# Patient Record
Sex: MALE | Race: WHITE | NOT HISPANIC OR LATINO | Employment: FULL TIME | URBAN - METROPOLITAN AREA
[De-identification: names, ages, dates, MRNs, and addresses within clinical notes are randomized per-mention and may not be internally consistent; named-entity substitution may affect disease eponyms.]

---

## 2022-08-31 ENCOUNTER — TRANSFERRED RECORDS (OUTPATIENT)
Dept: HEALTH INFORMATION MANAGEMENT | Facility: CLINIC | Age: 42
End: 2022-08-31

## 2024-05-24 ENCOUNTER — OFFICE VISIT (OUTPATIENT)
Dept: CARDIOLOGY | Facility: CLINIC | Age: 44
End: 2024-05-24
Payer: COMMERCIAL

## 2024-05-24 VITALS
BODY MASS INDEX: 41.94 KG/M2 | DIASTOLIC BLOOD PRESSURE: 94 MMHG | SYSTOLIC BLOOD PRESSURE: 130 MMHG | WEIGHT: 299.6 LBS | HEART RATE: 134 BPM | HEIGHT: 71 IN

## 2024-05-24 DIAGNOSIS — I48.0 PAROXYSMAL ATRIAL FIBRILLATION (H): Primary | ICD-10-CM

## 2024-05-24 DIAGNOSIS — I10 PRIMARY HYPERTENSION: ICD-10-CM

## 2024-05-24 DIAGNOSIS — G47.33 OBSTRUCTIVE SLEEP APNEA SYNDROME: ICD-10-CM

## 2024-05-24 DIAGNOSIS — E66.01 MORBID OBESITY (H): ICD-10-CM

## 2024-05-24 PROCEDURE — 99204 OFFICE O/P NEW MOD 45 MIN: CPT | Performed by: INTERNAL MEDICINE

## 2024-05-24 PROCEDURE — 93000 ELECTROCARDIOGRAM COMPLETE: CPT | Performed by: INTERNAL MEDICINE

## 2024-05-24 RX ORDER — CYCLOBENZAPRINE HCL 5 MG
10 TABLET ORAL PRN
COMMUNITY
Start: 2024-03-21

## 2024-05-24 RX ORDER — DILTIAZEM HYDROCHLORIDE 120 MG/1
120 CAPSULE, EXTENDED RELEASE ORAL DAILY
Qty: 30 CAPSULE | Refills: 11 | Status: SHIPPED | OUTPATIENT
Start: 2024-05-24

## 2024-05-24 RX ORDER — ACETAMINOPHEN 325 MG/1
325-650 TABLET ORAL EVERY 6 HOURS PRN
COMMUNITY
End: 2024-07-18

## 2024-05-24 RX ORDER — CARVEDILOL 25 MG/1
50 TABLET ORAL 2 TIMES DAILY
COMMUNITY
Start: 2023-09-13

## 2024-05-24 RX ORDER — SPIRONOLACTONE 25 MG/1
1 TABLET ORAL DAILY
COMMUNITY
Start: 2023-07-11 | End: 2024-07-29

## 2024-05-24 RX ORDER — ALBUTEROL SULFATE 90 UG/1
2 AEROSOL, METERED RESPIRATORY (INHALATION) PRN
COMMUNITY
Start: 2022-08-31

## 2024-05-24 RX ORDER — LISINOPRIL AND HYDROCHLOROTHIAZIDE 20; 25 MG/1; MG/1
1 TABLET ORAL DAILY
COMMUNITY
Start: 2023-10-13

## 2024-05-24 NOTE — PROGRESS NOTES
HPI and Plan:   I had the pleasure of seeing Max Patiño in cardiology consultation for atrial fibrillation.    Is a 44-year-old male with past medical history of hypertension, obstructive sleep apnea due to obesity on CPAP but not tolerating it, paroxysmal atrial fibrillation who has been treated at [facility] for several years but is moving his care to us.    He was diagnosed with atrial fibrillation few years ago.  He initially was having infrequent palpitations but eventually after some heart monitors placed and implantation of implantable loop recorder, A-fib was confirmed.  He has been on Coreg 50 g twice daily and Xarelto.  Over the last year, his atrial fibrillation episodes are increasing in frequency and now occurring almost every day over the last only 1 to 2 hours.  He feels a fluttering in his chest and sometimes is tired and occasionally associate with shortness of breath.  There is no orthopnea or PND.    Because of frequent episodes of atrial fibrillation, he made this appointment.  Presently, we did an EKG as he was having elevated heart rates and he was in atrial fibrillation with heart rate of 130 bpm.  Coarse atrial flutter cannot be excluded.    Patient also was offered ablation in fact scheduled for that in November 2023 at [facility].  During induction and anesthesia, he had injury to his oral cavity and tonsillar tear with bleeding and the procedure was never performed.    He has had previous echocardiograms and the last one I reviewed through [facility] revealed a visually EF of 50%.  TR was not present therefore RVSP could not be calculated but IVC was dilated.    Patient has been a previous smoker.  He smoked 2 packs/day since age 13 but quit about 3 years ago.  He also was a heavy alcohol user but quit 3 years ago.  Now he does vaping as well as smokes marijuana.    He has family history of stroke in his grandfather and maybe he had atrial  fibrillation.    He has some glucose intolerance with previous A1c of 5.8.  No formal diabetes diagnosis.    On exam, he has irregular S1-S2 but distant heart sounds.  No significant murmurs.  Chest was reauscultation.  No peripheral edema.  He has thick and short neck and difficult to observe JVP.  No carotid bruits.    Of note, patient had a loop recorder in the past but the battery is dead now.    Impression    1.  Paroxysmal atrial fibrillation, increasing frequency of episodes with rapid rate  2.  Obstructive sleep apnea/obesity hypoventilation syndrome, not tolerating CPAP  3.  Hypertension  4.  Prior smoker and heavy alcohol user but quit 3 years ago    Discussion  At this time patient appears to be in atrial fibrillation with rapid rate.  I would suggest adding diltiazem 120 mg long-acting for better rate control and decrease frequency of recurrent episodes.  I will also refer him to electrophysiologist to consider ablation.    He is on Xarelto which will be continued.  His chads vascular score is 1 given his history of hypertension and perhaps to if we consider him to be diabetic.  He has no previous TIA or strokes.  No heart failure.    Echocardiogram will also be obtained to reassess wall motion and ejection fraction.    I will also refer him to sleep medicine as he wants to try another CPAP masks.  He is also trying to lose weight which I encouraged.    At [facility], he used to see Myra Kaur, our midlevel provider who came here from that facility and wants to continue to follow with her which I will arrange.    Plan  Add diltiazem long-acting  EP consultation for rhythm control with antiarrhythmic therapy and/or ablation  Discussed importance of weight loss  Sleep consult for sleep apnea  Follow-up with our DORITA, Myra Kaur  Echocardiogram, if suboptimal may need MRI       Sincerely,    Eliseo Corcoran MD      Today's clinic visit entailed:  Review of external notes as documented  elsewhere in note  Review of the result(s) of each unique test - EKG, ct chest, echo  Ordering of each unique test  Prescription drug management    Provider  Link to MDM Help Grid     The level of medical decision making during this visit was of high complexity.      Orders Placed This Encounter   Procedures    Follow-Up with Cardiology    Follow-Up with Cardiology DORITA    Adult Sleep Eval & Management Referral    EKG 12-lead complete w/read - Clinics (performed today)    Echocardiogram Complete       Orders Placed This Encounter   Medications    lisinopril-hydrochlorothiazide (ZESTORETIC) 20-25 MG tablet     Sig: Take 1 tablet by mouth daily    albuterol (PROAIR HFA/PROVENTIL HFA/VENTOLIN HFA) 108 (90 Base) MCG/ACT inhaler     Sig: Inhale 2 puffs into the lungs as needed    carvedilol (COREG) 25 MG tablet     Sig: Take 50 mg by mouth 2 times daily    cyclobenzaprine (FLEXERIL) 5 MG tablet     Sig: Take 10 mg by mouth as needed    rivaroxaban ANTICOAGULANT (XARELTO) 20 MG TABS tablet     Sig: Take 20 mg by mouth daily    spironolactone (ALDACTONE) 25 MG tablet     Sig: Take 1 tablet by mouth daily    acetaminophen (TYLENOL) 325 MG tablet     Sig: Take 325-650 mg by mouth every 6 hours as needed for mild pain    diltiazem ER (DILT-XR) 120 MG 24 hr capsule     Sig: Take 1 capsule (120 mg) by mouth daily     Dispense:  30 capsule     Refill:  11       Medications Discontinued During This Encounter   Medication Reason    lisinopril (PRINIVIL,ZESTRIL) 20 MG tablet Med Rec(No AVS / No eCancel)    Melatonin 5 MG TABS Med Rec(No AVS / No eCancel)    Multiple Vitamin (DAILY MULTIVITAMIN PO) Med Rec(No AVS / No eCancel)         Encounter Diagnoses   Name Primary?    Primary hypertension     Obstructive sleep apnea syndrome     Morbid obesity (H)     Paroxysmal atrial fibrillation (H) Yes       CURRENT MEDICATIONS:  Current Outpatient Medications   Medication Sig Dispense Refill    acetaminophen (TYLENOL) 325 MG tablet Take  325-650 mg by mouth every 6 hours as needed for mild pain      albuterol (PROAIR HFA/PROVENTIL HFA/VENTOLIN HFA) 108 (90 Base) MCG/ACT inhaler Inhale 2 puffs into the lungs as needed      carvedilol (COREG) 25 MG tablet Take 50 mg by mouth 2 times daily      cyclobenzaprine (FLEXERIL) 5 MG tablet Take 10 mg by mouth as needed      diltiazem ER (DILT-XR) 120 MG 24 hr capsule Take 1 capsule (120 mg) by mouth daily 30 capsule 11    lisinopril-hydrochlorothiazide (ZESTORETIC) 20-25 MG tablet Take 1 tablet by mouth daily      rivaroxaban ANTICOAGULANT (XARELTO) 20 MG TABS tablet Take 20 mg by mouth daily      spironolactone (ALDACTONE) 25 MG tablet Take 1 tablet by mouth daily      HYDROcodone-acetaminophen 5-325 MG per tablet Take 1-2 tablets by mouth every 4 hours as needed for pain. (Patient not taking: Reported on 5/24/2024) 15 tablet 0    sertraline (ZOLOFT) 100 MG tablet Take 1 tablet every day   (Patient not taking: Reported on 5/24/2024) 30 tablet 0    zolpidem (AMBIEN) 10 MG tablet Take 2-3 tablets by mouth nightly as needed. (Patient not taking: Reported on 5/24/2024)         ALLERGIES   No Known Allergies    PAST MEDICAL HISTORY:  Past Medical History:   Diagnosis Date    Anxiety     Hypertension 12/14/2011    Morbid obesity (H) 12/14/2011    Sleep apnea 12/14/2011       PAST SURGICAL HISTORY:  Past Surgical History:   Procedure Laterality Date    ORIF right elbow         FAMILY HISTORY:  Family History   Problem Relation Age of Onset    Cerebrovascular Disease Maternal Grandfather     Hypertension Maternal Grandfather     Respiratory Mother         COPD       SOCIAL HISTORY:  Social History     Socioeconomic History    Marital status: Single     Spouse name: None    Number of children: None    Years of education: None    Highest education level: None   Tobacco Use    Smoking status: Former     Current packs/day: 0.00     Average packs/day: 2.0 packs/day for 18.0 years (36.0 ttl pk-yrs)     Types: Cigarettes  "    Start date: 1993     Quit date: 2011     Years since quittin.7    Smokeless tobacco: Current    Tobacco comments:     Uses vape   Substance and Sexual Activity    Alcohol use: Not Currently     Alcohol/week: 1.7 standard drinks of alcohol     Types: 2 drink(s) per week    Drug use: Yes     Types: Marijuana    Sexual activity: Yes     Partners: Female   Other Topics Concern    Exercise Yes     Comment: treadmill   Social History Narrative    ** Merged History Encounter **            Review of Systems:  Skin:          Eyes:         ENT:         Respiratory:  Negative       Cardiovascular:    Positive for;palpitations;dizziness;lightheadedness    Gastroenterology:        Genitourinary:         Musculoskeletal:         Neurologic:         Psychiatric:         Heme/Lymph/Imm:  Negative      Endocrine:  Negative        Physical Exam:  Vitals: BP (!) 130/94   Pulse (!) 134   Ht 1.803 m (5' 11\")   Wt 135.9 kg (299 lb 9.6 oz)   BMI 41.79 kg/m        CC  Referred Self, MD  No address on file                "

## 2024-05-24 NOTE — LETTER
5/24/2024    Kade Rodriguez MD  [address] [facility] [address]    RE: Max Patiño       Dear Colleague,     I had the pleasure of seeing Max Patiño in the [facility].  HPI and Plan:   I had the pleasure of seeing Max Patiño in cardiology consultation for atrial fibrillation.    Is a 44-year-old male with past medical history of hypertension, obstructive sleep apnea due to obesity on CPAP but not tolerating it, paroxysmal atrial fibrillation who has been treated at  for several years but is moving his care to us.    He was diagnosed with atrial fibrillation few years ago.  He initially was having infrequent palpitations but eventually after some heart monitors placed and implantation of implantable loop recorder, A-fib was confirmed.  He has been on Coreg 50 g twice daily and Xarelto.  Over the last year, his atrial fibrillation episodes are increasing in frequency and now occurring almost every day over the last only 1 to 2 hours.  He feels a fluttering in his chest and sometimes is tired and occasionally associate with shortness of breath.  There is no orthopnea or PND.    Because of frequent episodes of atrial fibrillation, he made this appointment.  Presently, we did an EKG as he was having elevated heart rates and he was in atrial fibrillation with heart rate of 130 bpm.  Coarse atrial flutter cannot be excluded.    Patient also was offered ablation in fact scheduled for that in November 2023 at .  During induction and anesthesia, he had injury to his oral cavity and tonsillar tear with bleeding and the procedure was never performed.    He has had previous echocardiograms and the last one I reviewed through [facility] revealed a visually EF of 50%.  TR was not present therefore RVSP could not be calculated but IVC was dilated.    Patient has been a previous smoker.  He smoked 2 packs/day since age 13 but  quit about 3 years ago.  He also was a heavy alcohol user but quit 3 years ago.  Now he does vaping as well as smokes marijuana.    He has family history of stroke in his grandfather and maybe he had atrial fibrillation.    He has some glucose intolerance with previous A1c of 5.8.  No formal diabetes diagnosis.    On exam, he has irregular S1-S2 but distant heart sounds.  No significant murmurs.  Chest was reauscultation.  No peripheral edema.  He has thick and short neck and difficult to observe JVP.  No carotid bruits.    Of note, patient had a loop recorder in the past but the battery is dead now.    Impression    1.  Paroxysmal atrial fibrillation, increasing frequency of episodes with rapid rate  2.  Obstructive sleep apnea/obesity hypoventilation syndrome, not tolerating CPAP  3.  Hypertension  4.  Prior smoker and heavy alcohol user but quit 3 years ago    Discussion  At this time patient appears to be in atrial fibrillation with rapid rate.  I would suggest adding diltiazem 120 mg long-acting for better rate control and decrease frequency of recurrent episodes.  I will also refer him to electrophysiologist to consider ablation.    He is on Xarelto which will be continued.  His chads vascular score is 1 given his history of hypertension and perhaps to if we consider him to be diabetic.  He has no previous TIA or strokes.  No heart failure.    Echocardiogram will also be obtained to reassess wall motion and ejection fraction.    I will also refer him to sleep medicine as he wants to try another CPAP masks.  He is also trying to lose weight which I encouraged.    At [facility], he used to see Myra Kaur, our midlevel provider who came here from that facility and wants to continue to follow with her which I will arrange.    Plan  Add diltiazem long-acting  EP consultation for rhythm control with antiarrhythmic therapy and/or ablation  Discussed importance of weight loss  Sleep consult for  sleep apnea  Follow-up with our DORITA, Myra Kaur  Echocardiogram, if suboptimal may need MRI       Sincerely,    Eliseo Corcoran MD      Today's clinic visit entailed:  Review of external notes as documented elsewhere in note  Review of the result(s) of each unique test - EKG, ct chest, echo  Ordering of each unique test  Prescription drug management    Provider  Link to [facility] Help Grid     The level of medical decision making during this visit was of high complexity.      Orders Placed This Encounter   Procedures    Follow-Up with Cardiology    Follow-Up with Cardiology DORITA    Adult Sleep Eval & Management Referral    EKG 12-lead complete w/read - Clinics (performed today)    Echocardiogram Complete       Orders Placed This Encounter   Medications    lisinopril-hydrochlorothiazide (ZESTORETIC) 20-25 MG tablet     Sig: Take 1 tablet by mouth daily    albuterol (PROAIR HFA/PROVENTIL HFA/VENTOLIN HFA) 108 (90 Base) MCG/ACT inhaler     Sig: Inhale 2 puffs into the lungs as needed    carvedilol (COREG) 25 MG tablet     Sig: Take 50 mg by mouth 2 times daily    cyclobenzaprine (FLEXERIL) 5 MG tablet     Sig: Take 10 mg by mouth as needed    rivaroxaban ANTICOAGULANT (XARELTO) 20 MG TABS tablet     Sig: Take 20 mg by mouth daily    spironolactone (ALDACTONE) 25 MG tablet     Sig: Take 1 tablet by mouth daily    acetaminophen (TYLENOL) 325 MG tablet     Sig: Take 325-650 mg by mouth every 6 hours as needed for mild pain    diltiazem ER (DILT-XR) 120 MG 24 hr capsule     Sig: Take 1 capsule (120 mg) by mouth daily     Dispense:  30 capsule     Refill:  11       Medications Discontinued During This Encounter   Medication Reason    lisinopril (PRINIVIL,ZESTRIL) 20 MG tablet Med Rec(No AVS / No eCancel)    Melatonin 5 MG TABS Med Rec(No AVS / No eCancel)    Multiple Vitamin (DAILY MULTIVITAMIN PO) Med Rec(No AVS / No eCancel)         Encounter Diagnoses   Name Primary?    Primary hypertension     Obstructive sleep apnea syndrome      Morbid obesity (H)     Paroxysmal atrial fibrillation (H) Yes       CURRENT MEDICATIONS:  Current Outpatient Medications   Medication Sig Dispense Refill    acetaminophen (TYLENOL) 325 MG tablet Take 325-650 mg by mouth every 6 hours as needed for mild pain      albuterol (PROAIR HFA/PROVENTIL HFA/VENTOLIN HFA) 108 (90 Base) MCG/ACT inhaler Inhale 2 puffs into the lungs as needed      carvedilol (COREG) 25 MG tablet Take 50 mg by mouth 2 times daily      cyclobenzaprine (FLEXERIL) 5 MG tablet Take 10 mg by mouth as needed      diltiazem ER (DILT-XR) 120 MG 24 hr capsule Take 1 capsule (120 mg) by mouth daily 30 capsule 11    lisinopril-hydrochlorothiazide (ZESTORETIC) 20-25 MG tablet Take 1 tablet by mouth daily      rivaroxaban ANTICOAGULANT (XARELTO) 20 MG TABS tablet Take 20 mg by mouth daily      spironolactone (ALDACTONE) 25 MG tablet Take 1 tablet by mouth daily      HYDROcodone-acetaminophen 5-325 MG per tablet Take 1-2 tablets by mouth every 4 hours as needed for pain. (Patient not taking: Reported on 5/24/2024) 15 tablet 0    sertraline (ZOLOFT) 100 MG tablet Take 1 tablet every day   (Patient not taking: Reported on 5/24/2024) 30 tablet 0    zolpidem (AMBIEN) 10 MG tablet Take 2-3 tablets by mouth nightly as needed. (Patient not taking: Reported on 5/24/2024)         ALLERGIES   No Known Allergies    PAST MEDICAL HISTORY:  Past Medical History:   Diagnosis Date    Anxiety     Hypertension 12/14/2011    Morbid obesity (H) 12/14/2011    Sleep apnea 12/14/2011       PAST SURGICAL HISTORY:  Past Surgical History:   Procedure Laterality Date    ORIF right elbow         FAMILY HISTORY:  Family History   Problem Relation Age of Onset    Cerebrovascular Disease Maternal Grandfather     Hypertension Maternal Grandfather     Respiratory Mother         COPD       SOCIAL HISTORY:  Social History     Socioeconomic History    Marital status: Single     Spouse name: None    Number of children: None    Years of  "education: None    Highest education level: None   Tobacco Use    Smoking status: Former     Current packs/day: 0.00     Average packs/day: 2.0 packs/day for 18.0 years (36.0 ttl pk-yrs)     Types: Cigarettes     Start date: 1993     Quit date: 2011     Years since quittin.7    Smokeless tobacco: Current    Tobacco comments:     Uses vape   Substance and Sexual Activity    Alcohol use: Not Currently     Alcohol/week: 1.7 standard drinks of alcohol     Types: 2 drink(s) per week    Drug use: Yes     Types: Marijuana    Sexual activity: Yes     Partners: Female   Other Topics Concern    Exercise Yes     Comment: treadmill   Social History Narrative    ** Merged History Encounter **            Review of Systems:  Skin:          Eyes:         ENT:         Respiratory:  Negative       Cardiovascular:    Positive for;palpitations;dizziness;lightheadedness    Gastroenterology:        Genitourinary:         Musculoskeletal:         Neurologic:         Psychiatric:         Heme/Lymph/Imm:  Negative      Endocrine:  Negative        Physical Exam:  Vitals: BP (!) 130/94   Pulse (!) 134   Ht 1.803 m (5' 11\")   Wt 135.9 kg (299 lb 9.6 oz)   BMI 41.79 kg/m        CC  Referred Self,       Thank you for allowing me to participate in the care of your patient.      Sincerely,     Eliseo Corcorna MD     [facility]  "

## 2024-05-30 ENCOUNTER — OFFICE VISIT (OUTPATIENT)
Dept: CARDIOLOGY | Facility: CLINIC | Age: 44
End: 2024-05-30
Attending: INTERNAL MEDICINE
Payer: COMMERCIAL

## 2024-05-30 VITALS
HEIGHT: 71 IN | WEIGHT: 291 LBS | OXYGEN SATURATION: 92 % | HEART RATE: 62 BPM | BODY MASS INDEX: 40.74 KG/M2 | SYSTOLIC BLOOD PRESSURE: 122 MMHG | DIASTOLIC BLOOD PRESSURE: 78 MMHG

## 2024-05-30 DIAGNOSIS — I48.0 PAROXYSMAL ATRIAL FIBRILLATION (H): ICD-10-CM

## 2024-05-30 PROCEDURE — 99214 OFFICE O/P EST MOD 30 MIN: CPT | Performed by: INTERNAL MEDICINE

## 2024-05-30 NOTE — LETTER
5/30/2024    Provider Not In System       RE: Max Patiño       Dear Colleague,     I had the pleasure of seeing Max Patiño in the [facility].    Electrophysiology Clinic Progress Note    Max Patiño MRN# [id number]   YOB: 1980 Age: 44 year old     Primary cardiologist: Dr. Corcoran         Assessment and Plan   Delightful 43 yo male with symptomatic paroxysmal AF past several years in the background of HTN, normal LVEF (2022), obesity and SALVADOR whom had an aborted EP study several months ago at [facility] after  injury to his oropharynx during intubation. Since then sxs more frequent with palpitations and sob. Recent ECG shows AFL with RVR. Echo pending. 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     Agree with rhythm control and given young age recommending ablation. Discussed risks of the procedure including but not limited to vascular injury, cardiac perforation and CVA. Continue with Xarelto for the procedure. Will make sure anesthesiology be aware of previous traumatic experience with intubation. Will need cardiac ct before procedure.           Review of Systems     12-pt ROS is negative except for as noted in the HPI.          Physical Exam     Vitals: There were no vitals taken for this visit.  Wt Readings from Last 10 Encounters:   05/24/24 135.9 kg (299 lb 9.6 oz)   07/17/13 122.5 kg (270 lb)   07/11/13 125.9 kg (277 lb 9.6 oz)   08/08/12 129.7 kg (286 lb)   06/21/12 131.1 kg (289 lb)   05/02/12 (!) 142 kg (313 lb)   04/23/12 (!) 144.7 kg (319 lb)   03/19/12 (!) 145.6 kg (321 lb)   02/08/12 (!) 146.8 kg (323 lb 9.6 oz)   02/01/12 (!) 146.5 kg (323 lb)       Constitutional:  Patient is pleasant, alert, cooperative, and in NAD.  HEENT:  NCAT. PERRLA. EOM's intact.   Neck:  CVP appears normal. No carotid bruits.   Pulmonary: Normal respiratory effort. CTAB.   Cardiac: RRR, normal S1/S2, no S3/S4, no murmur or rub.   Abdomen:  Non-tender abdomen, no  "hepatosplenomegaly appreciated.   Vascular: Pulses in the upper and lower extremities are 2+ and equal bilaterally.  Extremities: No edema, erythema, cyanosis or tenderness appreciated.  Skin:  No rashes or lesions appreciated.   Neurological:  No gross motor or sensory deficits.   Psych: Appropriate affect.          Data   Labs reviewed:  No lab results found.    Invalid input(s): \"CMP\", \"CBC\"    Lab Results   Component Value Date    WBC 10.0 12/14/2011    RBC 4.73 12/14/2011    HGB 14.9 12/14/2011    HCT 44.6 12/14/2011    MCV 94 12/14/2011    MCH 31.5 12/14/2011    MCHC 33.4 12/14/2011    RDW 13.1 12/14/2011     12/14/2011       Lab Results   Component Value Date     12/28/2011    POTASSIUM 4.1 12/28/2011    CHLORIDE 104 12/28/2011    CO2 30 12/28/2011    ANIONGAP 9 12/28/2011    GLC 88 12/28/2011    BUN 17 12/28/2011    CR 0.98 12/28/2011    GFRESTIMATED 88 12/28/2011    GFRESTBLACK >90 12/28/2011    JANNETTE 9.5 12/28/2011      Lab Results   Component Value Date    AST 31 12/14/2011    ALT 31 12/14/2011       No results found for: \"A1C\"    No results found for: \"INR\"         Problem List     Patient Active Problem List   Diagnosis    Sleep apnea    Morbid obesity (H)    Hypertension    Paroxysmal atrial fibrillation (H)            Medications     Current Outpatient Medications   Medication Sig Dispense Refill    acetaminophen (TYLENOL) 325 MG tablet Take 325-650 mg by mouth every 6 hours as needed for mild pain      albuterol (PROAIR HFA/PROVENTIL HFA/VENTOLIN HFA) 108 (90 Base) MCG/ACT inhaler Inhale 2 puffs into the lungs as needed      carvedilol (COREG) 25 MG tablet Take 50 mg by mouth 2 times daily      cyclobenzaprine (FLEXERIL) 5 MG tablet Take 10 mg by mouth as needed      diltiazem ER (DILT-XR) 120 MG 24 hr capsule Take 1 capsule (120 mg) by mouth daily 30 capsule 11    HYDROcodone-acetaminophen 5-325 MG per tablet Take 1-2 tablets by mouth every 4 hours as needed for pain. (Patient not taking: " Reported on 2024) 15 tablet 0    lisinopril-hydrochlorothiazide (ZESTORETIC) 20-25 MG tablet Take 1 tablet by mouth daily      rivaroxaban ANTICOAGULANT (XARELTO) 20 MG TABS tablet Take 20 mg by mouth daily      sertraline (ZOLOFT) 100 MG tablet Take 1 tablet every day   (Patient not taking: Reported on 2024) 30 tablet 0    spironolactone (ALDACTONE) 25 MG tablet Take 1 tablet by mouth daily      zolpidem (AMBIEN) 10 MG tablet Take 2-3 tablets by mouth nightly as needed. (Patient not taking: Reported on 2024)              Past Medical History     Past Medical History:   Diagnosis Date    Anxiety     Hypertension 2011    Morbid obesity (H) 2011    Sleep apnea 2011     Past Surgical History:   Procedure Laterality Date    ORIF right elbow       Family History   Problem Relation Age of Onset    Cerebrovascular Disease Maternal Grandfather     Hypertension Maternal Grandfather     Respiratory Mother         COPD     Social History     Socioeconomic History    Marital status: Single     Spouse name: Not on file    Number of children: Not on file    Years of education: Not on file    Highest education level: Not on file   Occupational History    Not on file   Tobacco Use    Smoking status: Former     Current packs/day: 0.00     Average packs/day: 2.0 packs/day for 18.0 years (36.0 ttl pk-yrs)     Types: Cigarettes     Start date: 1993     Quit date: 2011     Years since quittin.7    Smokeless tobacco: Current    Tobacco comments:     Uses vape   Substance and Sexual Activity    Alcohol use: Not Currently     Alcohol/week: 1.7 standard drinks of alcohol     Types: 2 drink(s) per week    Drug use: Yes     Types: Marijuana    Sexual activity: Yes     Partners: Female   Other Topics Concern     Service Not Asked    Blood Transfusions Not Asked    Caffeine Concern Not Asked    Occupational Exposure Not Asked    Hobby Hazards Not Asked    Sleep Concern Not Asked    Stress  Concern Not Asked    Weight Concern Not Asked    Special Diet Not Asked    Back Care Not Asked    Exercise Yes     Comment: treadmill    Bike Helmet Not Asked    Seat Belt Not Asked    Self-Exams Not Asked   Social History Narrative    ** Merged History Encounter **          Social Determinants of Health     Financial Resource Strain: Not on file   Food Insecurity: Not on file   Transportation Needs: Not on file   Physical Activity: Not on file   Stress: Not on file   Social Connections: Not on file   Interpersonal Safety: Not on file   Housing Stability: Not on file            Allergies   Patient has no known allergies.    Today's clinic visit entailed:  The following tests were independently interpreted by me as noted in my documentation: ecg  30 minutes spent by me on the date of the encounter doing chart review, history and exam, documentation and further activities per the note  Provider  Link to MDM Help Grid     The level of medical decision making during this visit was of moderate complexity.       Thank you for allowing me to participate in the care of your patient.      Sincerely,     Kaushal Roldan MD     [facility]  cc:   Eliseo Corcoran MD  [address]  [place] [address]

## 2024-05-30 NOTE — PROGRESS NOTES
Electrophysiology Clinic Progress Note    Max Patiño MRN# [id number]   YOB: 1980 Age: 44 year old     Primary cardiologist: Dr. Corcoran         Assessment and Plan   Delightful 45 yo male with symptomatic paroxysmal AF past several years in the background of HTN, normal LVEF (2022), obesity and SALVADOR whom had an aborted EP study several months ago at [facility] after  injury to his oropharynx during intubation. Since then sxs more frequent with palpitations and sob. Recent ECG shows AFL with RVR. Echo pending. 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     Agree with rhythm control and given young age recommending ablation. Discussed risks of the procedure including but not limited to vascular injury, cardiac perforation and CVA. Continue with Xarelto for the procedure. Will make sure anesthesiology be aware of previous traumatic experience with intubation. Will need cardiac ct before procedure.           Review of Systems     12-pt ROS is negative except for as noted in the HPI.          Physical Exam     Vitals: There were no vitals taken for this visit.  Wt Readings from Last 10 Encounters:   05/24/24 135.9 kg (299 lb 9.6 oz)   07/17/13 122.5 kg (270 lb)   07/11/13 125.9 kg (277 lb 9.6 oz)   08/08/12 129.7 kg (286 lb)   06/21/12 131.1 kg (289 lb)   05/02/12 (!) 142 kg (313 lb)   04/23/12 (!) 144.7 kg (319 lb)   03/19/12 (!) 145.6 kg (321 lb)   02/08/12 (!) 146.8 kg (323 lb 9.6 oz)   02/01/12 (!) 146.5 kg (323 lb)       Constitutional:  Patient is pleasant, alert, cooperative, and in NAD.  HEENT:  NCAT. PERRLA. EOM's intact.   Neck:  CVP appears normal. No carotid bruits.   Pulmonary: Normal respiratory effort. CTAB.   Cardiac: RRR, normal S1/S2, no S3/S4, no murmur or rub.   Abdomen:  Non-tender abdomen, no hepatosplenomegaly appreciated.   Vascular: Pulses in the upper and lower extremities are 2+ and equal bilaterally.  Extremities: No edema, erythema, cyanosis or tenderness  "appreciated.  Skin:  No rashes or lesions appreciated.   Neurological:  No gross motor or sensory deficits.   Psych: Appropriate affect.          Data   Labs reviewed:  No lab results found.    Invalid input(s): \"CMP\", \"CBC\"    Lab Results   Component Value Date    WBC 10.0 12/14/2011    RBC 4.73 12/14/2011    HGB 14.9 12/14/2011    HCT 44.6 12/14/2011    MCV 94 12/14/2011    MCH 31.5 12/14/2011    MCHC 33.4 12/14/2011    RDW 13.1 12/14/2011     12/14/2011       Lab Results   Component Value Date     12/28/2011    POTASSIUM 4.1 12/28/2011    CHLORIDE 104 12/28/2011    CO2 30 12/28/2011    ANIONGAP 9 12/28/2011    GLC 88 12/28/2011    BUN 17 12/28/2011    CR 0.98 12/28/2011    GFRESTIMATED 88 12/28/2011    GFRESTBLACK >90 12/28/2011    JANNETTE 9.5 12/28/2011      Lab Results   Component Value Date    AST 31 12/14/2011    ALT 31 12/14/2011       No results found for: \"A1C\"    No results found for: \"INR\"         Problem List     Patient Active Problem List   Diagnosis    Sleep apnea    Morbid obesity (H)    Hypertension    Paroxysmal atrial fibrillation (H)            Medications     Current Outpatient Medications   Medication Sig Dispense Refill    acetaminophen (TYLENOL) 325 MG tablet Take 325-650 mg by mouth every 6 hours as needed for mild pain      albuterol (PROAIR HFA/PROVENTIL HFA/VENTOLIN HFA) 108 (90 Base) MCG/ACT inhaler Inhale 2 puffs into the lungs as needed      carvedilol (COREG) 25 MG tablet Take 50 mg by mouth 2 times daily      cyclobenzaprine (FLEXERIL) 5 MG tablet Take 10 mg by mouth as needed      diltiazem ER (DILT-XR) 120 MG 24 hr capsule Take 1 capsule (120 mg) by mouth daily 30 capsule 11    HYDROcodone-acetaminophen 5-325 MG per tablet Take 1-2 tablets by mouth every 4 hours as needed for pain. (Patient not taking: Reported on 5/24/2024) 15 tablet 0    lisinopril-hydrochlorothiazide (ZESTORETIC) 20-25 MG tablet Take 1 tablet by mouth daily      rivaroxaban ANTICOAGULANT (XARELTO) 20 " MG TABS tablet Take 20 mg by mouth daily      sertraline (ZOLOFT) 100 MG tablet Take 1 tablet every day   (Patient not taking: Reported on 2024) 30 tablet 0    spironolactone (ALDACTONE) 25 MG tablet Take 1 tablet by mouth daily      zolpidem (AMBIEN) 10 MG tablet Take 2-3 tablets by mouth nightly as needed. (Patient not taking: Reported on 2024)              Past Medical History     Past Medical History:   Diagnosis Date    Anxiety     Hypertension 2011    Morbid obesity (H) 2011    Sleep apnea 2011     Past Surgical History:   Procedure Laterality Date    ORIF right elbow       Family History   Problem Relation Age of Onset    Cerebrovascular Disease Maternal Grandfather     Hypertension Maternal Grandfather     Respiratory Mother         COPD     Social History     Socioeconomic History    Marital status: Single     Spouse name: Not on file    Number of children: Not on file    Years of education: Not on file    Highest education level: Not on file   Occupational History    Not on file   Tobacco Use    Smoking status: Former     Current packs/day: 0.00     Average packs/day: 2.0 packs/day for 18.0 years (36.0 ttl pk-yrs)     Types: Cigarettes     Start date: 1993     Quit date: 2011     Years since quittin.7    Smokeless tobacco: Current    Tobacco comments:     Uses vape   Substance and Sexual Activity    Alcohol use: Not Currently     Alcohol/week: 1.7 standard drinks of alcohol     Types: 2 drink(s) per week    Drug use: Yes     Types: Marijuana    Sexual activity: Yes     Partners: Female   Other Topics Concern     Service Not Asked    Blood Transfusions Not Asked    Caffeine Concern Not Asked    Occupational Exposure Not Asked    Hobby Hazards Not Asked    Sleep Concern Not Asked    Stress Concern Not Asked    Weight Concern Not Asked    Special Diet Not Asked    Back Care Not Asked    Exercise Yes     Comment: treadmill    Bike Helmet Not Asked    Seat Belt  Not Asked    Self-Exams Not Asked   Social History Narrative    ** Merged History Encounter **          Social Determinants of Health     Financial Resource Strain: Not on file   Food Insecurity: Not on file   Transportation Needs: Not on file   Physical Activity: Not on file   Stress: Not on file   Social Connections: Not on file   Interpersonal Safety: Not on file   Housing Stability: Not on file            Allergies   Patient has no known allergies.    Today's clinic visit entailed:  The following tests were independently interpreted by me as noted in my documentation: ecg  30 minutes spent by me on the date of the encounter doing chart review, history and exam, documentation and further activities per the note  Provider  Link to MDM Help Grid     The level of medical decision making during this visit was of moderate complexity.

## 2024-06-26 ENCOUNTER — HOSPITAL ENCOUNTER (OUTPATIENT)
Dept: CARDIOLOGY | Facility: CLINIC | Age: 44
Discharge: HOME | End: 2024-06-26
Attending: INTERNAL MEDICINE | Admitting: INTERNAL MEDICINE
Payer: COMMERCIAL

## 2024-06-26 DIAGNOSIS — I48.0 PAROXYSMAL ATRIAL FIBRILLATION (H): ICD-10-CM

## 2024-06-26 LAB — LVEF ECHO: NORMAL

## 2024-06-26 PROCEDURE — 93306 TTE W/DOPPLER COMPLETE: CPT | Mod: 26 | Performed by: INTERNAL MEDICINE

## 2024-06-26 PROCEDURE — 255N000002 HC RX 255 OP 636: Performed by: INTERNAL MEDICINE

## 2024-06-26 PROCEDURE — 999N000208 ECHOCARDIOGRAM COMPLETE

## 2024-06-26 RX ADMIN — HUMAN ALBUMIN MICROSPHERES AND PERFLUTREN 3 ML: 10; .22 INJECTION, SOLUTION INTRAVENOUS at 15:07

## 2024-07-05 ENCOUNTER — TELEPHONE (OUTPATIENT)
Dept: CARDIOLOGY | Facility: CLINIC | Age: 44
End: 2024-07-05

## 2024-07-05 ENCOUNTER — HOSPITAL ENCOUNTER (OUTPATIENT)
Dept: CARDIOLOGY | Facility: CLINIC | Age: 44
Discharge: HOME | End: 2024-07-05
Attending: INTERNAL MEDICINE | Admitting: INTERNAL MEDICINE
Payer: COMMERCIAL

## 2024-07-05 DIAGNOSIS — I48.0 PAROXYSMAL ATRIAL FIBRILLATION (H): Primary | ICD-10-CM

## 2024-07-05 DIAGNOSIS — I48.0 PAROXYSMAL ATRIAL FIBRILLATION (H): ICD-10-CM

## 2024-07-05 PROCEDURE — 75572 CT HRT W/3D IMAGE: CPT | Mod: 26 | Performed by: INTERNAL MEDICINE

## 2024-07-05 PROCEDURE — 250N000011 HC RX IP 250 OP 636: Performed by: INTERNAL MEDICINE

## 2024-07-05 PROCEDURE — 75572 CT HRT W/3D IMAGE: CPT

## 2024-07-05 RX ORDER — IOPAMIDOL 755 MG/ML
500 INJECTION, SOLUTION INTRAVASCULAR ONCE
Status: COMPLETED | OUTPATIENT
Start: 2024-07-05 | End: 2024-07-05

## 2024-07-05 RX ORDER — SODIUM CHLORIDE, SODIUM LACTATE, POTASSIUM CHLORIDE, CALCIUM CHLORIDE 600; 310; 30; 20 MG/100ML; MG/100ML; MG/100ML; MG/100ML
INJECTION, SOLUTION INTRAVENOUS CONTINUOUS
Status: CANCELLED | OUTPATIENT
Start: 2024-07-05

## 2024-07-05 RX ORDER — LIDOCAINE 40 MG/G
CREAM TOPICAL
Status: CANCELLED | OUTPATIENT
Start: 2024-07-05

## 2024-07-05 RX ORDER — LIDOCAINE 40 MG/G
CREAM TOPICAL
Status: DISCONTINUED | OUTPATIENT
Start: 2024-07-05 | End: 2024-07-06 | Stop reason: HOSPADM

## 2024-07-05 RX ADMIN — IOPAMIDOL 100 ML: 755 INJECTION, SOLUTION INTRAVENOUS at 12:38

## 2024-07-05 NOTE — TELEPHONE ENCOUNTER
Spoke to pt regarding A Fib ablation on Monday 7/8.  Pt is aware of arrival time of 1030 and discussed where pt is to be dropped of at.    Pt reminded to have No solids 8 hours prior to arrival time  And may have Clear liquids up until 2 hours prior to arrival  Discussed clear liquids allowed ( 7 up, ginger ale, chicken broth, apple juice, water, coffee with no cream or sugar)   Pt may have sips of water for am meds    Discussed meds to be held:     Oral diabetes meds: NONE  Insulin: NONE    SGLT2 Inhibitors: NONE  - Invokana (canagliflozin), Farxiga (dapagliflozin), Jardiance (empagliflozin), Steglatro (ertugliflozin)  - hold 3-4 days prior to procedure    GLP-1 Agonists: NONE  - Byetta (exenitide), Victoza (liraglutide), Ozempic, Wegovy, Rybelsus (semaglutide), Trulicity (dulaglutide), Mounjaro (tirzepatide), Bydureon (Exenatide ER), Adlyxin (Lixisendatide)   - (Weekly dosing, hold GLP-1 agonists 7 days before procedure)  - (Daily or BID dosing, hold GLP-1 agonists day before and day of procedure)  - (Oral semaglutide, hold 7 days before procedure due to long half-life)    Diuretic: Spironolactone-to be held in the morning         Patient has a  for ride home and someone to stay with pt x 24 hours once pt arrives home   Pt given [facility] number to call if unable to  proceed with ablation on Monday d/t illness, Ie fever, covid, cough, cold  Pt aware that he will go home the same day if procedure goes well, pt is able to void, no elevated pain and groin bleed.   Pt voiced understanding and stated he has no further questions at this time.  Ebenezer

## 2024-07-08 ENCOUNTER — ANESTHESIA (OUTPATIENT)
Dept: CARDIOLOGY | Facility: CLINIC | Age: 44
End: 2024-07-08
Payer: COMMERCIAL

## 2024-07-08 ENCOUNTER — HOSPITAL ENCOUNTER (OUTPATIENT)
Facility: CLINIC | Age: 44
Discharge: HOME | End: 2024-07-08
Attending: INTERNAL MEDICINE | Admitting: INTERNAL MEDICINE
Payer: COMMERCIAL

## 2024-07-08 ENCOUNTER — TELEPHONE (OUTPATIENT)
Dept: CARDIOLOGY | Facility: CLINIC | Age: 44
End: 2024-07-08

## 2024-07-08 ENCOUNTER — ANESTHESIA EVENT (OUTPATIENT)
Dept: CARDIOLOGY | Facility: CLINIC | Age: 44
End: 2024-07-08
Payer: COMMERCIAL

## 2024-07-08 VITALS
RESPIRATION RATE: 16 BRPM | WEIGHT: 289.6 LBS | OXYGEN SATURATION: 97 % | HEART RATE: 65 BPM | HEIGHT: 71 IN | BODY MASS INDEX: 40.54 KG/M2 | TEMPERATURE: 96.8 F | SYSTOLIC BLOOD PRESSURE: 119 MMHG | DIASTOLIC BLOOD PRESSURE: 74 MMHG

## 2024-07-08 DIAGNOSIS — I48.0 PAROXYSMAL ATRIAL FIBRILLATION (H): ICD-10-CM

## 2024-07-08 DIAGNOSIS — I48.0 PAROXYSMAL ATRIAL FIBRILLATION (H): Primary | ICD-10-CM

## 2024-07-08 LAB
ACT BLD: 266 SECONDS (ref 74–150)
ANION GAP SERPL CALCULATED.3IONS-SCNC: 8 MMOL/L (ref 7–15)
BUN SERPL-MCNC: 14 MG/DL (ref 6–20)
CALCIUM SERPL-MCNC: 9.6 MG/DL (ref 8.6–10)
CHLORIDE SERPL-SCNC: 103 MMOL/L (ref 98–107)
CREAT SERPL-MCNC: 0.97 MG/DL (ref 0.67–1.17)
DEPRECATED HCO3 PLAS-SCNC: 27 MMOL/L (ref 22–29)
EGFRCR SERPLBLD CKD-EPI 2021: >90 ML/MIN/1.73M2
ERYTHROCYTE [DISTWIDTH] IN BLOOD BY AUTOMATED COUNT: 12.7 % (ref 10–15)
GLUCOSE SERPL-MCNC: 97 MG/DL (ref 70–99)
HCT VFR BLD AUTO: 39.7 % (ref 40–53)
HGB BLD-MCNC: 13 G/DL (ref 13.3–17.7)
MCH RBC QN AUTO: 31 PG (ref 26.5–33)
MCHC RBC AUTO-ENTMCNC: 32.7 G/DL (ref 31.5–36.5)
MCV RBC AUTO: 95 FL (ref 78–100)
PLATELET # BLD AUTO: 247 10E3/UL (ref 150–450)
POTASSIUM SERPL-SCNC: 4.8 MMOL/L (ref 3.4–5.3)
RBC # BLD AUTO: 4.19 10E6/UL (ref 4.4–5.9)
SODIUM SERPL-SCNC: 138 MMOL/L (ref 135–145)
WBC # BLD AUTO: 10.7 10E3/UL (ref 4–11)

## 2024-07-08 PROCEDURE — 93656 COMPRE EP EVAL ABLTJ ATR FIB: CPT | Performed by: INTERNAL MEDICINE

## 2024-07-08 PROCEDURE — 250N000011 HC RX IP 250 OP 636: Performed by: INTERNAL MEDICINE

## 2024-07-08 PROCEDURE — C1887 CATHETER, GUIDING: HCPCS | Performed by: INTERNAL MEDICINE

## 2024-07-08 PROCEDURE — C1732 CATH, EP, DIAG/ABL, 3D/VECT: HCPCS | Performed by: INTERNAL MEDICINE

## 2024-07-08 PROCEDURE — 93655 ICAR CATH ABLTJ DSCRT ARRHYT: CPT | Performed by: INTERNAL MEDICINE

## 2024-07-08 PROCEDURE — 250N000011 HC RX IP 250 OP 636: Performed by: ANESTHESIOLOGY

## 2024-07-08 PROCEDURE — 258N000003 HC RX IP 258 OP 636: Performed by: INTERNAL MEDICINE

## 2024-07-08 PROCEDURE — 370N000017 HC ANESTHESIA TECHNICAL FEE, PER MIN: Performed by: INTERNAL MEDICINE

## 2024-07-08 PROCEDURE — 250N000011 HC RX IP 250 OP 636: Performed by: NURSE ANESTHETIST, CERTIFIED REGISTERED

## 2024-07-08 PROCEDURE — 258N000003 HC RX IP 258 OP 636: Performed by: NURSE ANESTHETIST, CERTIFIED REGISTERED

## 2024-07-08 PROCEDURE — C1730 CATH, EP, 19 OR FEW ELECT: HCPCS | Performed by: INTERNAL MEDICINE

## 2024-07-08 PROCEDURE — 999N000184 HC STATISTIC TELEMETRY

## 2024-07-08 PROCEDURE — 999N000071 HC STATISTIC HEART CATH LAB OR EP LAB

## 2024-07-08 PROCEDURE — C1766 INTRO/SHEATH,STRBLE,NON-PEEL: HCPCS | Performed by: INTERNAL MEDICINE

## 2024-07-08 PROCEDURE — C1759 CATH, INTRA ECHOCARDIOGRAPHY: HCPCS | Performed by: INTERNAL MEDICINE

## 2024-07-08 PROCEDURE — 93656 COMPRE EP EVAL ABLTJ ATR FIB: CPT | Performed by: NURSE ANESTHETIST, CERTIFIED REGISTERED

## 2024-07-08 PROCEDURE — 93656 COMPRE EP EVAL ABLTJ ATR FIB: CPT | Performed by: ANESTHESIOLOGY

## 2024-07-08 PROCEDURE — C1760 CLOSURE DEV, VASC: HCPCS | Performed by: INTERNAL MEDICINE

## 2024-07-08 PROCEDURE — C1733 CATH, EP, OTHR THAN COOL-TIP: HCPCS | Performed by: INTERNAL MEDICINE

## 2024-07-08 PROCEDURE — 272N000001 HC OR GENERAL SUPPLY STERILE: Performed by: INTERNAL MEDICINE

## 2024-07-08 PROCEDURE — 250N000009 HC RX 250: Performed by: NURSE ANESTHETIST, CERTIFIED REGISTERED

## 2024-07-08 PROCEDURE — 250N000025 HC SEVOFLURANE, PER MIN: Performed by: INTERNAL MEDICINE

## 2024-07-08 PROCEDURE — 36415 COLL VENOUS BLD VENIPUNCTURE: CPT | Performed by: INTERNAL MEDICINE

## 2024-07-08 PROCEDURE — 80048 BASIC METABOLIC PNL TOTAL CA: CPT | Performed by: INTERNAL MEDICINE

## 2024-07-08 PROCEDURE — 85347 COAGULATION TIME ACTIVATED: CPT

## 2024-07-08 PROCEDURE — 85027 COMPLETE CBC AUTOMATED: CPT | Performed by: INTERNAL MEDICINE

## 2024-07-08 RX ORDER — OXYCODONE HYDROCHLORIDE 5 MG/1
10 TABLET ORAL
Status: DISCONTINUED | OUTPATIENT
Start: 2024-07-08 | End: 2024-07-08 | Stop reason: HOSPADM

## 2024-07-08 RX ORDER — GLYCOPYRROLATE 0.2 MG/ML
INJECTION, SOLUTION INTRAMUSCULAR; INTRAVENOUS PRN
Status: DISCONTINUED | OUTPATIENT
Start: 2024-07-08 | End: 2024-07-08

## 2024-07-08 RX ORDER — DEXAMETHASONE SODIUM PHOSPHATE 4 MG/ML
4 INJECTION, SOLUTION INTRA-ARTICULAR; INTRALESIONAL; INTRAMUSCULAR; INTRAVENOUS; SOFT TISSUE
Status: DISCONTINUED | OUTPATIENT
Start: 2024-07-08 | End: 2024-07-08 | Stop reason: HOSPADM

## 2024-07-08 RX ORDER — LIDOCAINE 40 MG/G
CREAM TOPICAL
Status: DISCONTINUED | OUTPATIENT
Start: 2024-07-08 | End: 2024-07-08 | Stop reason: HOSPADM

## 2024-07-08 RX ORDER — SODIUM CHLORIDE, SODIUM LACTATE, POTASSIUM CHLORIDE, CALCIUM CHLORIDE 600; 310; 30; 20 MG/100ML; MG/100ML; MG/100ML; MG/100ML
INJECTION, SOLUTION INTRAVENOUS CONTINUOUS
Status: DISCONTINUED | OUTPATIENT
Start: 2024-07-08 | End: 2024-07-08 | Stop reason: HOSPADM

## 2024-07-08 RX ORDER — NALOXONE HYDROCHLORIDE 0.4 MG/ML
0.4 INJECTION, SOLUTION INTRAMUSCULAR; INTRAVENOUS; SUBCUTANEOUS
Status: DISCONTINUED | OUTPATIENT
Start: 2024-07-08 | End: 2024-07-08 | Stop reason: HOSPADM

## 2024-07-08 RX ORDER — HEPARIN SODIUM 1000 [USP'U]/ML
INJECTION, SOLUTION INTRAVENOUS; SUBCUTANEOUS
Status: DISCONTINUED | OUTPATIENT
Start: 2024-07-08 | End: 2024-07-08 | Stop reason: HOSPADM

## 2024-07-08 RX ORDER — NALOXONE HYDROCHLORIDE 0.4 MG/ML
0.2 INJECTION, SOLUTION INTRAMUSCULAR; INTRAVENOUS; SUBCUTANEOUS
Status: DISCONTINUED | OUTPATIENT
Start: 2024-07-08 | End: 2024-07-08 | Stop reason: HOSPADM

## 2024-07-08 RX ORDER — NALOXONE HYDROCHLORIDE 0.4 MG/ML
0.1 INJECTION, SOLUTION INTRAMUSCULAR; INTRAVENOUS; SUBCUTANEOUS
Status: DISCONTINUED | OUTPATIENT
Start: 2024-07-08 | End: 2024-07-08 | Stop reason: HOSPADM

## 2024-07-08 RX ORDER — MEPERIDINE HYDROCHLORIDE 25 MG/ML
12.5 INJECTION INTRAMUSCULAR; INTRAVENOUS; SUBCUTANEOUS EVERY 5 MIN PRN
Status: DISCONTINUED | OUTPATIENT
Start: 2024-07-08 | End: 2024-07-08 | Stop reason: HOSPADM

## 2024-07-08 RX ORDER — HYDROMORPHONE HYDROCHLORIDE 1 MG/ML
0.2 INJECTION, SOLUTION INTRAMUSCULAR; INTRAVENOUS; SUBCUTANEOUS EVERY 5 MIN PRN
Status: DISCONTINUED | OUTPATIENT
Start: 2024-07-08 | End: 2024-07-08 | Stop reason: HOSPADM

## 2024-07-08 RX ORDER — PROTAMINE SULFATE 10 MG/ML
INJECTION, SOLUTION INTRAVENOUS
Status: DISCONTINUED | OUTPATIENT
Start: 2024-07-08 | End: 2024-07-08 | Stop reason: HOSPADM

## 2024-07-08 RX ORDER — FENTANYL CITRATE 50 UG/ML
25 INJECTION, SOLUTION INTRAMUSCULAR; INTRAVENOUS
Status: DISCONTINUED | OUTPATIENT
Start: 2024-07-08 | End: 2024-07-08 | Stop reason: HOSPADM

## 2024-07-08 RX ORDER — ONDANSETRON 4 MG/1
4 TABLET, ORALLY DISINTEGRATING ORAL EVERY 30 MIN PRN
Status: DISCONTINUED | OUTPATIENT
Start: 2024-07-08 | End: 2024-07-08 | Stop reason: HOSPADM

## 2024-07-08 RX ORDER — ONDANSETRON 2 MG/ML
4 INJECTION INTRAMUSCULAR; INTRAVENOUS EVERY 30 MIN PRN
Status: DISCONTINUED | OUTPATIENT
Start: 2024-07-08 | End: 2024-07-08 | Stop reason: HOSPADM

## 2024-07-08 RX ORDER — DEXAMETHASONE SODIUM PHOSPHATE 4 MG/ML
INJECTION, SOLUTION INTRA-ARTICULAR; INTRALESIONAL; INTRAMUSCULAR; INTRAVENOUS; SOFT TISSUE PRN
Status: DISCONTINUED | OUTPATIENT
Start: 2024-07-08 | End: 2024-07-08

## 2024-07-08 RX ORDER — OXYCODONE AND ACETAMINOPHEN 5; 325 MG/1; MG/1
1 TABLET ORAL EVERY 4 HOURS PRN
Status: DISCONTINUED | OUTPATIENT
Start: 2024-07-08 | End: 2024-07-08 | Stop reason: HOSPADM

## 2024-07-08 RX ORDER — HYDROMORPHONE HYDROCHLORIDE 1 MG/ML
0.4 INJECTION, SOLUTION INTRAMUSCULAR; INTRAVENOUS; SUBCUTANEOUS EVERY 5 MIN PRN
Status: DISCONTINUED | OUTPATIENT
Start: 2024-07-08 | End: 2024-07-08 | Stop reason: HOSPADM

## 2024-07-08 RX ORDER — ACETAMINOPHEN 325 MG/1
975 TABLET ORAL
Status: DISCONTINUED | OUTPATIENT
Start: 2024-07-08 | End: 2024-07-08 | Stop reason: HOSPADM

## 2024-07-08 RX ORDER — DEXMEDETOMIDINE HYDROCHLORIDE 4 UG/ML
INJECTION, SOLUTION INTRAVENOUS PRN
Status: DISCONTINUED | OUTPATIENT
Start: 2024-07-08 | End: 2024-07-08

## 2024-07-08 RX ORDER — PROPOFOL 10 MG/ML
INJECTION, EMULSION INTRAVENOUS PRN
Status: DISCONTINUED | OUTPATIENT
Start: 2024-07-08 | End: 2024-07-08

## 2024-07-08 RX ORDER — FENTANYL CITRATE 50 UG/ML
50 INJECTION, SOLUTION INTRAMUSCULAR; INTRAVENOUS EVERY 5 MIN PRN
Status: DISCONTINUED | OUTPATIENT
Start: 2024-07-08 | End: 2024-07-08 | Stop reason: HOSPADM

## 2024-07-08 RX ORDER — KETAMINE HYDROCHLORIDE 10 MG/ML
INJECTION INTRAMUSCULAR; INTRAVENOUS PRN
Status: DISCONTINUED | OUTPATIENT
Start: 2024-07-08 | End: 2024-07-08

## 2024-07-08 RX ORDER — KETOROLAC TROMETHAMINE 15 MG/ML
INJECTION, SOLUTION INTRAMUSCULAR; INTRAVENOUS
Status: DISCONTINUED | OUTPATIENT
Start: 2024-07-08 | End: 2024-07-08 | Stop reason: HOSPADM

## 2024-07-08 RX ORDER — ONDANSETRON 2 MG/ML
INJECTION INTRAMUSCULAR; INTRAVENOUS PRN
Status: DISCONTINUED | OUTPATIENT
Start: 2024-07-08 | End: 2024-07-08

## 2024-07-08 RX ORDER — OXYCODONE HYDROCHLORIDE 5 MG/1
5 TABLET ORAL
Status: DISCONTINUED | OUTPATIENT
Start: 2024-07-08 | End: 2024-07-08 | Stop reason: HOSPADM

## 2024-07-08 RX ORDER — FENTANYL CITRATE 50 UG/ML
25 INJECTION, SOLUTION INTRAMUSCULAR; INTRAVENOUS EVERY 5 MIN PRN
Status: DISCONTINUED | OUTPATIENT
Start: 2024-07-08 | End: 2024-07-08 | Stop reason: HOSPADM

## 2024-07-08 RX ORDER — FENTANYL CITRATE 50 UG/ML
INJECTION, SOLUTION INTRAMUSCULAR; INTRAVENOUS PRN
Status: DISCONTINUED | OUTPATIENT
Start: 2024-07-08 | End: 2024-07-08

## 2024-07-08 RX ADMIN — PHENYLEPHRINE HYDROCHLORIDE 100 MCG: 10 INJECTION INTRAVENOUS at 12:19

## 2024-07-08 RX ADMIN — PHENYLEPHRINE HYDROCHLORIDE 0.5 MCG/KG/MIN: 10 INJECTION INTRAVENOUS at 12:17

## 2024-07-08 RX ADMIN — DEXAMETHASONE SODIUM PHOSPHATE 4 MG: 4 INJECTION, SOLUTION INTRA-ARTICULAR; INTRALESIONAL; INTRAMUSCULAR; INTRAVENOUS; SOFT TISSUE at 12:15

## 2024-07-08 RX ADMIN — ROCURONIUM BROMIDE 100 MG: 50 INJECTION, SOLUTION INTRAVENOUS at 12:10

## 2024-07-08 RX ADMIN — PROPOFOL 20 MCG/KG/MIN: 10 INJECTION, EMULSION INTRAVENOUS at 12:17

## 2024-07-08 RX ADMIN — PHENYLEPHRINE HYDROCHLORIDE 100 MCG: 10 INJECTION INTRAVENOUS at 12:51

## 2024-07-08 RX ADMIN — DEXMEDETOMIDINE HYDROCHLORIDE 20 MCG: 200 INJECTION INTRAVENOUS at 13:11

## 2024-07-08 RX ADMIN — SODIUM CHLORIDE, POTASSIUM CHLORIDE, SODIUM LACTATE AND CALCIUM CHLORIDE: 600; 310; 30; 20 INJECTION, SOLUTION INTRAVENOUS at 11:34

## 2024-07-08 RX ADMIN — FENTANYL CITRATE 50 MCG: 50 INJECTION, SOLUTION INTRAMUSCULAR; INTRAVENOUS at 14:10

## 2024-07-08 RX ADMIN — PROPOFOL 200 MG: 10 INJECTION, EMULSION INTRAVENOUS at 12:10

## 2024-07-08 RX ADMIN — MIDAZOLAM 2 MG: 1 INJECTION INTRAMUSCULAR; INTRAVENOUS at 12:05

## 2024-07-08 RX ADMIN — ONDANSETRON 4 MG: 2 INJECTION INTRAMUSCULAR; INTRAVENOUS at 13:21

## 2024-07-08 RX ADMIN — GLYCOPYRROLATE 0.4 MG: 0.2 INJECTION, SOLUTION INTRAMUSCULAR; INTRAVENOUS at 12:52

## 2024-07-08 RX ADMIN — DEXMEDETOMIDINE HYDROCHLORIDE 20 MCG: 200 INJECTION INTRAVENOUS at 13:21

## 2024-07-08 RX ADMIN — SUGAMMADEX 300 MG: 100 INJECTION, SOLUTION INTRAVENOUS at 13:26

## 2024-07-08 RX ADMIN — Medication 50 MG: at 12:09

## 2024-07-08 RX ADMIN — FENTANYL CITRATE 100 MCG: 50 INJECTION INTRAMUSCULAR; INTRAVENOUS at 12:08

## 2024-07-08 RX ADMIN — PHENYLEPHRINE HYDROCHLORIDE 150 MCG: 10 INJECTION INTRAVENOUS at 12:24

## 2024-07-08 ASSESSMENT — ACTIVITIES OF DAILY LIVING (ADL)
ADLS_ACUITY_SCORE: 35

## 2024-07-08 ASSESSMENT — COPD QUESTIONNAIRES: COPD: 1

## 2024-07-08 ASSESSMENT — ENCOUNTER SYMPTOMS: DYSRHYTHMIAS: 1

## 2024-07-08 NOTE — DISCHARGE SUMMARY
[facility] Discharge Nursing Note    Patient Information  Name: Max Patiño  Age: 44 year old    Discharge Education:  Discharge instructions reviewed: Yes  Additional education/resources provided: no  Patient/patient representative verbalizes understanding: Yes  Patient discharging on new medications: No  Medication education completed: N/A    Discharge Plans:   Discharge location: home  Discharge ride contacted: Yes  Approximate discharge time: 1530    Discharge Criteria:  Discharge criteria met and vital signs stable: Yes    Patient Belongs:  Patient belongings returned to patient: Yes    Brown Hinson RN     2

## 2024-07-08 NOTE — PROGRESS NOTES
1525 Report received from John Hinson RN.  1530 Pt A/O. Gauze drsg CDI to right groin puncture sites. No oozing or hematoma noted. Area soft & flat. Pt denies pain. Activity restrictions reinforced with pt with verbal understanding received. Pt's sister at bedside. Detailed update given. OOB - steady on feet. Pt dressed & ready for discharge.   1540 Pt states that he needs a return to work release. Release obtained & given to pt. Pt also needs new Xarelto RX as he is completely out of them.  1550 Outpt RX called. There is a problem with pt's pharmacy & insurance company.   1555 Pt taking diet & flds well. No complaints.  1600 SABINE Graham - A Fib nurse called & detailed update given. Will contact Dr Mata & discuss.  1615 Return call from SABINE Graham. Will switch pt over to Eliquis, discontinue Xarelto, fill Eliquis RX in Outpt Pharmacy & send pt an Eliquis copay card in the mail. Pt informed.   1630 Discharge teaching & instructions reinforced with both pt & sister w/ verbal understanding received. All questions & concerns addressed.  1705 Eliquis RX given to pt.   1715 Pt discharged per w/c to private vehicle. All personal belongings taken w/ pt.

## 2024-07-08 NOTE — DISCHARGE INSTRUCTIONS
A Fib Ablation Discharge Instructions    After you go home:  Have an adult stay with you until tomorrow.  You may eat your normal diet, unless your doctor tells you otherwise.  RELAX and take it easy for 3 days.        For 24-48 hours (due to the sedation you received):  DO NOT DRIVE FOR 2 DAYS!   Do NOT make any important or legal decisions.  Do NOT drive or operate machines at home or at work.  Do NOT drink alcohol.    Care of Puncture Site:  Check the puncture site severy 1-2 hours while awake.  For 2-3 days, when you cough, sneeze, laugh or move your bowels, hold your hand over the puncture sites and press firmly.  Please remove Dressing after 24 hours.  Then apply a band aid daily for at least 3 days (if needed). If there is minor oozing, apply another band aid and remove it after 12 hours.   It is normal to have a bruising or a small lump that is present under the skin . This will go away on its own after 3-4 weeks.   You may shower. Do NOT take a bath, or use a hot tub or pool until groin site heals, which may take up to a week.  Do NOT scrub the site. Do NOT use lotion or powder near the puncture site.    Activity:  NO bending, stooping over or squatting  for 3 days  Do NOT lift, push or pull more than 10 pounds (equal to a gallon of milk) for 3 days.  NO repetitive motions such as loading , vacuuming, raking, shoveling,   Limit going up and down the stairs repetitively, for the first 24 hours after procedure.     Bleeding:  If you start bleeding from the groin site, lie down flat and press firmly on the site for 10 minutes or until bleeding stops.   Once bleeding stops, lay flat for 1-2 hours.  Call your A Fib nurse if bleeding does not stop or after hours will need to go to ER.       Go to ER or Call 911 right away if you have heavy bleeding or bleeding that does not stop.    Medications:  Take your medications, including blood thinners, unless your doctor tells you not to.  If you have stopped  any other medicines, check with your nurse or provider about when to restart them.  If you have PAIN or a TIGHTNESS in your chest, you MAY take Tylenol (acetaminophen) and if this does not help, you MAY take Advil (ibuprofen-400 mg with food).  If you have constipation or prone to constipation,  take a fiber supplement, ie metamucil or stool softners.    Call the A Fib RN if:  Chest pain not relieved by acetaminophen or ibuprofen  Difficulty swallowing and/or coughing up blood  Shortness of breath  Increased groin pain or a large or growing hard lump around the site.  Groin site is red, swollen, hot or tender.  Blood or fluid is draining from the groin site.  You have chills or a fever greater than 101 F (38 C).  Your leg feels numb, cool or changes color.  If groin pain is not relieved by Tylenol or Ibuprofen.  Recurrent irregular or fast heart rate lasting over 2-3 hours.  Any questions or concerns.    Heart rhythms:  You may have some irregular heartbeats. These feel very strong. They may make you feel that the A Fib is going to start again.  Give it time. The irregular beats should occur less often.    Follow Up Appointments:  Frances Hernández on 7/18/24 at 12:30 with and EKG  Dr Mata on 10/8/24 at 4:15 with and EKG     [facility]   A Fib clinic RN's Sylvia Graham [phone] (Mon-Fri, 8:00-4:30)   [phone] Option 2 (7 days a week) after hours for on call Cardiologist.

## 2024-07-08 NOTE — ANESTHESIA CARE TRANSFER NOTE
Patient: Max Patiño    Procedure: Procedure(s):  Ablation Atrial Fibrilation       Diagnosis: af/afl  Diagnosis Additional Information: No value filed.    Anesthesia Type:   General     Note:    Oropharynx: oral airway in place  Level of Consciousness: awake  Oxygen Supplementation: face mask  Level of Supplemental Oxygen (L/min / FiO2): 10  Independent Airway: airway patency satisfactory and stable  Dentition: dentition unchanged  Vital Signs Stable: post-procedure vital signs reviewed and stable  Report to RN Given: handoff report given  Patient transferred to: PACU    Handoff Report: Identifed the Patient, Identified the Reponsible Provider, Reviewed the pertinent medical history, Discussed the surgical course, Reviewed Intra-OP anesthesia mangement and issues during anesthesia, Set expectations for post-procedure period and Allowed opportunity for questions and acknowledgement of understanding  Vitals:  Vitals Value Taken Time   BP     Temp     Pulse 64 07/08/24 1343   Resp 15 07/08/24 1343   SpO2 99 % 07/08/24 1343   Vitals shown include unfiled device data.    Electronically Signed By: TEO Campos CRNA  July 8, 2024  1:44 PM

## 2024-07-08 NOTE — PROGRESS NOTES
Uneventful PVI and empiric ablation of the CTI  Resume Xarelto tonight  Accesses perclosed  Bedrest for an hour.

## 2024-07-08 NOTE — ANESTHESIA POSTPROCEDURE EVALUATION
Patient: Max Patiño    Procedure: Procedure(s):  Ablation Atrial Fibrilation       Anesthesia Type:  General    Note:  Disposition: Outpatient   Postop Pain Control: Uneventful            Sign Out: Well controlled pain   PONV: No   Neuro/Psych: Uneventful            Sign Out: Acceptable/Baseline neuro status   Airway/Respiratory: Uneventful            Sign Out: Acceptable/Baseline resp. status   CV/Hemodynamics: Uneventful            Sign Out: Acceptable CV status; No obvious hypovolemia; No obvious fluid overload   Other NRE:    DID A NON-ROUTINE EVENT OCCUR?     Event details/Postop Comments:  Patient is an easy intubation with a glidescope. Do not know where the difficult intubation tag came from (it was added on from this stay). Prior intubation traumatic but not because patient was a difficult intubation.           Last vitals:  Vitals Value Taken Time   /65 07/08/24 1440   Temp 36  C (96.8  F) 07/08/24 1342   Pulse 62 07/08/24 1448   Resp 15 07/08/24 1448   SpO2 96 % 07/08/24 1449   Vitals shown include unfiled device data.    Electronically Signed By: Salina Gambino MD  July 8, 2024  3:38 PM

## 2024-07-08 NOTE — H&P
[facility]    Cardiac Electrophysiology Consultation     Date of Admission:  7/8/2024  Date of Consult (When I saw the patient): 07/08/24    Assessment & Plan   Delightful 45 yo male with symptomatic paroxysmal AF past several years in the background of HTN, normal LVEF (2022), obesity and SALVADOR whom had an [place] [facility] to his oropharynx during intubation. Since then sxs more frequent with palpitations and sob. Recent ECG shows AFL with RVR. Echo pending. 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      Agree with rhythm control and given young age recommending ablation. Discussed risks of the procedure including but not limited to vascular injury, cardiac perforation and CVA. Continue with Xarelto for the procedure. Will make sure anesthesiology be aware of previous traumatic experience with intubation. Will need cardiac ct before procedure.    No change since last visit. Normal LVEF.    Kaushal Roldan MD    Code Status    No Order    Primary Care Physician   Provider Not In System    History is obtained from the patient    Past Medical History   I have reviewed this patient's medical history and updated it with pertinent information if needed.   Past Medical History:   Diagnosis Date    Anxiety     Difficult intubation     [place] [facility] to do an a.fib ablation    Hypertension 12/14/2011    Morbid obesity (H) 12/14/2011    Sleep apnea 12/14/2011    Uncomplicated asthma        Past Surgical History   I have reviewed this patient's surgical history and updated it with pertinent information if needed.  Past Surgical History:   Procedure Laterality Date    EP ABLATION FOCAL AFIB  2024 [place] [facility].  case aborted.    EP LOOP RECORDER      ORIF right elbow         Prior to Admission Medications   Prior to Admission Medications   Prescriptions Last Dose Informant Patient Reported?  Taking?   Coenzyme Q10 (COQ10 PO) Past Week  Yes Yes   HYDROcodone-acetaminophen 5-325 MG per tablet   No No   Sig: Take 1-2 tablets by mouth every 4 hours as needed for pain.   Patient not taking: Reported on 5/30/2024   Multiple Vitamins-Minerals (CENTRUM ADULT PO) Past Week  Yes Yes   Omega-3 Fatty Acids (FISH OIL PO) Past Week  Yes Yes   acetaminophen (TYLENOL) 325 MG tablet   Yes No   Sig: Take 325-650 mg by mouth every 6 hours as needed for mild pain   albuterol (PROAIR HFA/PROVENTIL HFA/VENTOLIN HFA) 108 (90 Base) MCG/ACT inhaler More than a month  Yes Yes   Sig: Inhale 2 puffs into the lungs as needed   carvedilol (COREG) 25 MG tablet 7/8/2024  Yes Yes   Sig: Take 50 mg by mouth 2 times daily   cyclobenzaprine (FLEXERIL) 5 MG tablet More than a month  Yes Yes   Sig: Take 10 mg by mouth as needed   diltiazem ER (DILT-XR) 120 MG 24 hr capsule   No No   Sig: Take 1 capsule (120 mg) by mouth daily   Patient not taking: Reported on 5/30/2024   lisinopril-hydrochlorothiazide (ZESTORETIC) 20-25 MG tablet 7/8/2024  Yes Yes   Sig: Take 1 tablet by mouth daily   rivaroxaban ANTICOAGULANT (XARELTO) 20 MG TABS tablet 7/7/2024  Yes Yes   Sig: Take 20 mg by mouth daily   sertraline (ZOLOFT) 100 MG tablet   No No   Sig: Take 1 tablet every day     Patient not taking: Reported on 5/24/2024   spironolactone (ALDACTONE) 25 MG tablet Past Week  Yes Yes   Sig: Take 1 tablet by mouth daily   zolpidem (AMBIEN) 10 MG tablet   Yes No   Sig: Take 2-3 tablets by mouth nightly as needed.   Patient not taking: Reported on 5/24/2024      Facility-Administered Medications: None     Allergies   No Known Allergies    Social History   I have reviewed this patient's social history and updated it with pertinent information if needed. Max Patiño  reports that he quit smoking about 12 years ago. His smoking use included cigarettes. He started smoking about 30 years ago. He has a 36 pack-year smoking history. He uses smokeless tobacco. He  reports that he does not currently use alcohol after a past usage of about 1.7 standard drinks of alcohol per week. He reports current drug use. Drug: Marijuana.    Family History   I have reviewed this patient's family history and updated it with pertinent information if needed.   Family History   Problem Relation Age of Onset    Cerebrovascular Disease Maternal Grandfather     Hypertension Maternal Grandfather     Respiratory Mother         COPD       Review of Systems   Comprehensive review of systems was performed with pertinent positives and negatives listed in assessment and plan section.    Physical Exam   Temp: 98.1  F (36.7  C) Temp src: Oral BP: 103/71 Pulse: 52   Resp: 18 SpO2: 95 % O2 Device: None (Room air)    Vital Signs with Ranges  Temp:  [98.1  F (36.7  C)] 98.1  F (36.7  C)  Pulse:  [52] 52  Resp:  [18] 18  BP: (103-133)/(69-71) 103/71  SpO2:  [95 %] 95 %  289 lbs 9.6 oz    Constitutional: awake, alert, cooperative, no apparent distress, and appears stated age  Eyes: Lids and lashes normal, pupils equal, round and reactive to light, extra ocular muscles intact, sclera clear, conjunctiva normal  ENT: Normocephalic, without obvious abnormality, atraumatic, sinuses nontender on palpation, external ears without lesions, oral pharynx with moist mucous membranes, tonsils without erythema or exudates, gums normal and good dentition.  Hematologic / Lymphatic: no cervical lymphadenopathy  Respiratory: No increased work of breathing, good air exchange, clear to auscultation bilaterally, no crackles or wheezing  Cardiovascular: Normal apical impulse, regular rate and rhythm, normal S1 and S2, no S3 or S4, and no murmur noted  GI: No scars, normal bowel sounds, soft, non-distended, non-tender, no masses palpated, no hepatosplenomegally  Skin: no bruising or bleeding  Musculoskeletal: There is no redness, warmth, or swelling of the joints.  Full range of motion noted.    Neurologic: Awake, alert, oriented to  name, place and time.    Neuropsychiatric: General: normal, calm, and normal eye contact    Data   I personally reviewed all recent ECGs and images.  Results for orders placed or performed during the hospital encounter of 07/08/24 (from the past 24 hour(s))   CBC with platelets   Result Value Ref Range    WBC Count 10.7 4.0 - 11.0 10e3/uL    RBC Count 4.19 (L) 4.40 - 5.90 10e6/uL    Hemoglobin 13.0 (L) 13.3 - 17.7 g/dL    Hematocrit 39.7 (L) 40.0 - 53.0 %    MCV 95 78 - 100 fL    MCH 31.0 26.5 - 33.0 pg    MCHC 32.7 31.5 - 36.5 g/dL    RDW 12.7 10.0 - 15.0 %    Platelet Count 247 150 - 450 10e3/uL

## 2024-07-08 NOTE — TELEPHONE ENCOUNTER
Pt unable to  his Xarelto to do confusion at pharmacy and insurance and pt is unable to pay until Friday.  Spoke to Dr Mata and will change pt to Eliquis 5 mg bid and the first month will be free. Will mail the $10 co-pay card to pt for further refills. Ebenezer

## 2024-07-08 NOTE — ANESTHESIA PREPROCEDURE EVALUATION
"Anesthesia Pre-Procedure Evaluation    Patient: Max Patiño   MRN: [id number] : 1980        Procedure : Procedure(s):  Ablation Atrial Fibrilation          Past Medical History:   Diagnosis Date    Anxiety     Difficult intubation     2024 at [facility] [place] an attempt to do an a.fib ablation    Hypertension 2011    Morbid obesity (H) 2011    Sleep apnea 2011    Uncomplicated asthma       Past Surgical History:   Procedure Laterality Date    EP ABLATION FOCAL AFIB  2024 at [facility] with difficult intubation.  case aborted.    EP LOOP RECORDER      ORIF right elbow        No Known Allergies   Social History     Tobacco Use    Smoking status: Former     Current packs/day: 0.00     Average packs/day: 2.0 packs/day for 18.0 years (36.0 ttl pk-yrs)     Types: Cigarettes     Start date: 1993     Quit date: 2011     Years since quittin.8    Smokeless tobacco: Current    Tobacco comments:     Uses vape   Substance Use Topics    Alcohol use: Not Currently     Alcohol/week: 1.7 standard drinks of alcohol     Types: 2 drink(s) per week      Wt Readings from Last 1 Encounters:   24 131.4 kg (289 lb 9.6 oz)        Anesthesia Evaluation   Pt has had prior anesthetic.     History of anesthetic complications (had an airway trauma during prior anesthetic. Per anesthesia record, intubation was \"easy\")  - .      ROS/MED HX  ENT/Pulmonary:     (+) sleep apnea, doesn't use CPAP,                   Intermittent, asthma (does not use inhalers)    COPD,              Neurologic:       Cardiovascular:     (+) Dyslipidemia hypertension- -   -  - -                        dysrhythmias, a-fib,             METS/Exercise Tolerance:     Hematologic:       Musculoskeletal:       GI/Hepatic:     (+) GERD (asymptomatic, anesthesia record mentions edematous and red oropharynx tissue thought to be d/t GERD. Not on medications.), Other,                  Renal/Genitourinary:  - neg Renal ROS     Endo:   " "  (+)               Obesity (MO, BMI 40.4),       Psychiatric/Substance Use:       Infectious Disease:       Malignancy:       Other:            Physical Exam    Airway        Mallampati: II   TM distance: > 3 FB   Neck ROM: full   Mouth opening: > 3 cm    Respiratory Devices and Support         Dental       (+) Modest Abnormalities - crowns, retainers, 1 or 2 missing teeth      Cardiovascular          Rhythm and rate: regular and normal     Pulmonary           breath sounds clear to auscultation           OUTSIDE LABS:  CBC:   Lab Results   Component Value Date    WBC 10.7 07/08/2024    WBC 10.0 12/14/2011    HGB 13.0 (L) 07/08/2024    HGB 14.9 12/14/2011    HCT 39.7 (L) 07/08/2024    HCT 44.6 12/14/2011     07/08/2024     12/14/2011     BMP:   Lab Results   Component Value Date     12/28/2011     12/14/2011    POTASSIUM 4.1 12/28/2011    POTASSIUM 4.2 12/14/2011    CHLORIDE 104 12/28/2011    CHLORIDE 104 12/14/2011    CO2 30 12/28/2011    CO2 29 12/14/2011    BUN 17 12/28/2011    BUN 17 12/14/2011    CR 0.98 12/28/2011    CR 0.94 12/14/2011    GLC 88 12/28/2011    GLC 99 12/14/2011     COAGS: No results found for: \"PTT\", \"INR\", \"FIBR\"  POC: No results found for: \"BGM\", \"HCG\", \"HCGS\"  HEPATIC:   Lab Results   Component Value Date    ALBUMIN 4.4 12/14/2011    PROTTOTAL 7.8 12/14/2011    ALT 31 12/14/2011    AST 31 12/14/2011    ALKPHOS 84 12/14/2011    BILITOTAL 0.5 12/14/2011     OTHER:   Lab Results   Component Value Date    JANNETTE 9.5 12/28/2011    TSH 2.28 12/14/2011    CRP 5.1 12/14/2011    SED 7 12/14/2011       Anesthesia Plan    ASA Status:  3    NPO Status:  NPO Appropriate    Anesthesia Type: General.     - Airway: ETT         Techniques and Equipment:     - Airway: Video-Laryngoscope       Consents    Anesthesia Plan(s) and associated risks, benefits, and realistic alternatives discussed. Questions answered and patient/representative(s) expressed understanding.     - Discussed: " "Risks, Benefits and Alternatives for BOTH SEDATION and the PROCEDURE were discussed     - Discussed with:  Patient            Postoperative Care            Comments:               Salina Gambino MD    I have reviewed the pertinent notes and labs in the chart from the past 30 days and (re)examined the patient.  Any updates or changes from those notes are reflected in this note.            # Drug Induced Coagulation Defect: home medication list includes an anticoagulant medication   # Severe Obesity: Estimated body mass index is 40.39 kg/m  as calculated from the following:    Height as of this encounter: 1.803 m (5' 11\").    Weight as of this encounter: 131.4 kg (289 lb 9.6 oz).      "

## 2024-07-08 NOTE — PROGRESS NOTES
Care Suites Admission Nursing Note    Patient Information  Name: Max Patiño  Age: 44 year old  Reason for admission: afib ablation  Care Suites arrival time: 1100    Visitor Information  Name: Clarisa Chavez [phone]     Patient Admission/Assessment   Pre-procedure assessment complete: Yes  If abnormal assessment/labs, provider notified: No  NPO: Yes  Medications held per instructions/orders: N/A  Consent: obtained  If applicable, pregnancy test status: declined  Patient oriented to room: Yes  Education/questions answered: Yes  Plan/other: Review labs, obtain consent and proceed with scheduled treatment or intervention      Discharge Planning  Discharge name/phone number: Clarisa barkley - [phone]  Overnight post sedation caregiver: Clarisa Chavez [phone]  Discharge location: [place]    Brown Hinson RN

## 2024-07-09 ENCOUNTER — TELEPHONE (OUTPATIENT)
Dept: CARDIOLOGY | Facility: CLINIC | Age: 44
End: 2024-07-09

## 2024-07-09 NOTE — TELEPHONE ENCOUNTER
7/9/24 Called pt in follow up to  Afib Ablation performed yesterday     Reviewed discharge instructions with patient    Pain level is 0    Groin dressing is C/D and I with plans to remove later today. Wound care discussed and reasons to call Afib RN     Breathing feels comfortable     Pt is eating, drinking and urinating without difficulty . LBM was pre-procedure . Discussed if no BM by tomorrow , ok to use OTC stool softener of Miralax     Reviewed activity instructions    Reviewed any medication changes - none    Discussed that possible Afib episodes may occur and to call if > 2 hours in duration or if symptomatic or sustained HR > 120    Discussed reasons to call Afib RN    Reminded of follow up appointments    Verified pt has Afib RN and after hours Cardiology phone numbers    Pt voiced understanding and has no further questions/concerns at this time.    Kamryn  338 pm

## 2024-07-18 ENCOUNTER — OFFICE VISIT (OUTPATIENT)
Dept: CARDIOLOGY | Facility: CLINIC | Age: 44
End: 2024-07-18
Payer: COMMERCIAL

## 2024-07-18 VITALS
DIASTOLIC BLOOD PRESSURE: 73 MMHG | HEART RATE: 61 BPM | RESPIRATION RATE: 25 BRPM | WEIGHT: 295 LBS | SYSTOLIC BLOOD PRESSURE: 109 MMHG | BODY MASS INDEX: 41.3 KG/M2 | HEIGHT: 71 IN | OXYGEN SATURATION: 98 %

## 2024-07-18 DIAGNOSIS — G47.33 OBSTRUCTIVE SLEEP APNEA SYNDROME: ICD-10-CM

## 2024-07-18 DIAGNOSIS — I48.0 PAROXYSMAL ATRIAL FIBRILLATION (H): ICD-10-CM

## 2024-07-18 DIAGNOSIS — I48.0 PAF (PAROXYSMAL ATRIAL FIBRILLATION) (H): Primary | ICD-10-CM

## 2024-07-18 PROCEDURE — 99215 OFFICE O/P EST HI 40 MIN: CPT | Performed by: NURSE PRACTITIONER

## 2024-07-18 PROCEDURE — 93000 ELECTROCARDIOGRAM COMPLETE: CPT | Performed by: NURSE PRACTITIONER

## 2024-07-18 NOTE — PATIENT INSTRUCTIONS
Today's Recommendations    Please call with concerns of break through atrial fibrillation.  Remain on current medications  Please follow up with Dr. Mata in October.    Please send [employer] message or call for further questions or concerns.     It was a pleasure to see you today.     Gregory-    Frances Hernández APRN, CNP    EP [phone]  Device [phone]  General scheduling and after hours number [phone]  EP scheduling [phone]

## 2024-07-18 NOTE — LETTER
7/18/2024    Provider Not In System       RE: Max Patiño       Dear Colleague,     I had the [facility].    Electrophysiology Clinic Progress Note  Max Patiño MRN# [id number]   YOB: 1980 Age: 44 year old     Primary cardiologist: Dr. Mata (EP) and Dr. Corcoran (general)    Reason for visit: Atrial fibrillation    History of presenting illness:    Max Patiño is a pleasant 44 year old patient with past medical history significant for:    Symptomatic paroxysmal atrial fibrillation with RVR: History of palpitations and underwent ILR in 2020 that showed increasing burden of PAF on rate control with carvedilol 50 mg twice daily and anticoagulation with Xarelto..  Underwent an attempted ablation [facility] in 11/2023 but was aborted due to injury to his oropharynx during intubation and sedation.  Status post EPS and PVI as well as empiric CTI ablation on 7/8/2024  [id number] score 1 (hypertension) currently anticoagulated on Xarelto  Hypertension  Obesity  SALVADOR on CPAP but not tolerating  Previous heavy EtOH use and smoker (2 packs/day x since the age of 13) quit both approximately 3 years ago  Currently vapes and uses marijuana    Previously followed with electrophysiology at [facility] and had ILR placed in 2020 that noted increased AF burden. In 11/2023 an ablation was attempted at [facility], but during induction and anesthesia he had an injury to his oral cavity with a tonsillar tear and bleeding.  Therefore, the procedure was never completed. The patient sought a second opinion and was evaluated by Dr. Corcoran on 5/24/2024.  At that visit it was recommended that he add long-acting diltiazem to carvedilol 50 mg twice daily, update echocardiogram, sleep consult,  EP consultation for management of AF.    The patient met with Dr. Mata on 5/30/2024 who recommended proceeding with an ablation that was completed on 7/8/2024.  He underwent an uneventful wide  circumferential PVI ablation and empiric ablation of the CTI.    Today he returns for a follow-up post the procedure.  He has had intermittent chest discomfort and palpitations but no significant breakthroughs of AF that he is aware of.  He does report that he did not start diltiazem as requested by Dr. Mata.  Of note, the patient has an MRI coming up this weekend and will be meeting with orthopedics for timing of a hip replacement.    Diagnotic studies:  EKG 7/18/2024: SR 66 bpm, , QT 4/1/2012, QTc 422, QRS duration 119  Echocardiogram 6/2024: LVEF of 60 to 65% without valvular or wall motion abnormalities.  EKG 5/24/2024: AF with RVR.            Assessment and Plan:     ASSESSMENT:    Symptomatic paroxysmal atrial fibrillation with RVR   History of palpitations and underwent ILR in 2020 (device is no longer functional) that showed increasing burden of PAF on rate control with carvedilol 50 mg twice daily and anticoagulation with Eliquis previously on Xarelto but was cost prohibitive.  [place]went an [place] [facility] [place] but was aborted due to injury to his oropharynx during intubation and sedation.  Status post EPS and PVI as well as empiric CTI ablation on 7/8/2024  [id number] score 1 (hypertension)   EKG today notes SR  Rate control with carvedilol 50 mg twice daily    Hypertension  Well controlled on Aldactone mg daily, lisinopril-HCTZ 20-25 mg daily and carvedilol 50 mg twice daily    PLAN:     Continue present medical therapy and anticoagulation for now.  Return to visit follow-up with Dr. Mata in October and can discuss stopping AC.  In the future, the patient would like to follow-up with Myra Kaur PA-C as he previously followed with her at [facility].       Orders this Visit:  Orders Placed This Encounter   Procedures     EKG 12-lead complete w/read - Clinics (performed today)     No orders of the defined types were placed in this encounter.    Medications Discontinued During This  "Encounter   Medication Reason     HYDROcodone-acetaminophen 5-325 MG per tablet Stopped by Patient (No AVS)     sertraline (ZOLOFT) 100 MG tablet Stopped by Patient (No AVS)     zolpidem (AMBIEN) 10 MG tablet Stopped by Patient (No AVS)     acetaminophen (TYLENOL) 325 MG tablet Stopped by Patient (No AVS)       Today's clinic visit entailed:  Review of prior external note(s) from - CareEverywhere information from [facility]  reviewed  Review of the result(s) of each unique test - Echo, EKG, ILR recording  The following tests were independently interpreted by me as noted in my documentation: EKG  I spent a total of 40 minutes on the day of the visit.   Time spent by me doing chart review, history and exam, documentation and further activities per the note  Provider  Link to [facility] Help Grid     The level of medical decision making during this visit was of moderate complexity.           Review of Systems:     Review of Systems:  Skin:  not assessed     Eyes:  not assessed    ENT:  not assessed    Respiratory:  Negative    Cardiovascular:    Positive for;palpitations  Gastroenterology: Positive for reflux  Genitourinary:  not assessed    Musculoskeletal:  not assessed    Neurologic:  Negative    Psychiatric:  not assessed    Heme/Lymph/Imm:  Negative    Endocrine:  Negative              Physical Exam:     Vitals: /73   Pulse 61   Resp 25   Ht 1.803 m (5' 11\")   Wt 133.8 kg (295 lb)   SpO2 98%   BMI 41.14 kg/m    Constitutional: Well nourished and in no apparent distress.  Eyes: Pupils equal, round.   HEENT: Normocephalic, atraumatic.   Neck: Supple.   Respiratory: Breathing non-labored. Lungs clear to auscultation bilaterally.  Cardiovascular:  Regular rate and rhythm, normal S1 and S2. No murmur   Skin: Warm, dry.   Extremities: No edema. No concerns with right femoral venous site  Neurologic: No gross motor deficits. Alert, awake, and oriented to person, place and time.  Psychiatric: " Affect appropriate.        CURRENT MEDICATIONS:  Current Outpatient Medications   Medication Sig Dispense Refill     albuterol (PROAIR HFA/PROVENTIL HFA/VENTOLIN HFA) 108 (90 Base) MCG/ACT inhaler Inhale 2 puffs into the lungs as needed       apixaban ANTICOAGULANT (ELIQUIS) 5 MG tablet Take 1 tablet (5 mg) by mouth 2 times daily 60 tablet 0     carvedilol (COREG) 25 MG tablet Take 50 mg by mouth 2 times daily       Coenzyme Q10 (COQ10 PO)        cyclobenzaprine (FLEXERIL) 5 MG tablet Take 10 mg by mouth as needed       lisinopril-hydrochlorothiazide (ZESTORETIC) 20-25 MG tablet Take 1 tablet by mouth daily       Multiple Vitamins-Minerals (CENTRUM ADULT PO)        Omega-3 Fatty Acids (FISH OIL PO)        spironolactone (ALDACTONE) 25 MG tablet Take 1 tablet by mouth daily       diltiazem ER (DILT-XR) 120 MG 24 hr capsule Take 1 capsule (120 mg) by mouth daily (Patient not taking: Reported on 5/30/2024) 30 capsule 11       ALLERGIES  No Known Allergies      PAST MEDICAL HISTORY:  Past Medical History:   Diagnosis Date     Anxiety      Difficult intubation     11/2024 at [facility] [place] with an attempt to do an a.fib ablation     Hypertension 12/14/2011     Morbid obesity (H) 12/14/2011     Sleep apnea 12/14/2011     Uncomplicated asthma        PAST SURGICAL HISTORY:  Past Surgical History:   Procedure Laterality Date     EP ABLATION FOCAL AFIB  2023 11/23 at [facility] [place] w/ difficult intubtion - case aborted     EP ABLATION FOCAL AFIB N/A 7/8/2024    Procedure: Ablation Atrial Fibrilation;  Surgeon: Kaushal Mata MD;  Location: [facility] CARDIAC CATH LAB     EP LOOP RECORDER       ORIF right elbow         FAMILY HISTORY:  Family History   Problem Relation Age of Onset     Cerebrovascular Disease Maternal Grandfather      Hypertension Maternal Grandfather      Respiratory Mother         COPD       SOCIAL HISTORY:  Social History     Socioeconomic History     Marital status: Single   Tobacco Use     Smoking status: Former      Current packs/day: 0.00     Average packs/day: 2.0 packs/day for 18.0 years (36.0 ttl pk-yrs)     Types: Cigarettes     Start date: 1993     Quit date: 2011     Years since quittin.8     Smokeless tobacco: Current     Tobacco comments:     Uses vape   Substance and Sexual Activity     Alcohol use: Not Currently     Alcohol/week: 1.7 standard drinks of alcohol     Types: 2 drink(s) per week     Drug use: Yes     Types: Marijuana     Comment: pt did this yesterday 24     Sexual activity: Yes     Partners: Female   Other Topics Concern     Exercise Yes     Comment: treadmill   Social History Narrative    ** Merged History Encounter **                  Thank you for allowing me to participate in the care of your patient.      Sincerely,     [facility]  cc:   No referring provider defined for this encounter.

## 2024-07-18 NOTE — PROGRESS NOTES
Electrophysiology Clinic Progress Note  Max Patiño MRN# [id number]   YOB: 1980 Age: 44 year old     Primary cardiologist: Dr. Mata (EP) and Dr. Corcoran (general)    Reason for visit: Atrial fibrillation    History of presenting illness:    Max Patiño is a pleasant 44 year old patient with past medical history significant for:    Symptomatic paroxysmal atrial fibrillation with RVR: History of palpitations and underwent ILR in 2020 that showed increasing burden of PAF on rate control with carvedilol 50 mg twice daily and anticoagulation with Xarelto..  Underwent an attempted ablation at [facility] in 11/2023 but was aborted due to injury to his oropharynx during intubation and sedation.  Status post EPS and PVI as well as empiric CTI ablation on 7/8/2024  [id number] score 1 (hypertension) currently anticoagulated on Xarelto  Hypertension  Obesity  SALVADOR on CPAP but not tolerating  Previous heavy EtOH use and smoker (2 packs/day x since the age of 13) quit both approximately 3 years ago  Currently vapes and uses marijuana    Previously followed with electrophysiology at [facility] and had ILR placed in 2020 that noted increased AF burden. In 11/2023 an ablation was attempted at [facility], but during induction and anesthesia he had an injury to his oral cavity with a tonsillar tear and bleeding.  Therefore, the procedure was never completed. The patient sought a second opinion and was evaluated by Dr. Corcoran on 5/24/2024.  At that visit it was recommended that he add long-acting diltiazem to carvedilol 50 mg twice daily, update echocardiogram, sleep consult,  EP consultation for management of AF.    The patient met with Dr. Mata on 5/30/2024 who recommended proceeding with an ablation that was completed on 7/8/2024.  He underwent an uneventful wide circumferential PVI ablation and empiric ablation of the CTI.    Today he returns for a follow-up post the procedure.  He has had intermittent chest discomfort and  palpitations but no significant breakthroughs of AF that he is aware of.  He does report that he did not start diltiazem as requested by Dr. Mata.  Of note, the patient has an MRI coming up this weekend and will be meeting with orthopedics for timing of a hip replacement.    Diagnotic studies:  EKG 7/18/2024: SR 66 bpm, , QT 4/1/2012, QTc 422, QRS duration 119  Echocardiogram 6/2024: LVEF of 60 to 65% without valvular or wall motion abnormalities.  EKG 5/24/2024: AF with RVR.            Assessment and Plan:     ASSESSMENT:    Symptomatic paroxysmal atrial fibrillation with RVR   History of palpitations and underwent ILR in 2020 (device is no longer functional) that showed increasing burden of PAF on rate control with carvedilol 50 mg twice daily and anticoagulation with Eliquis previously on Xarelto but was cost prohibitive.  Underwent an attempted ablation [place] [facility] [place] but was aborted due to injury to his oropharynx during intubation and sedation.  Status post EPS and PVI as well as empiric CTI ablation on 7/8/2024  [id number] score 1 (hypertension)   EKG today notes SR  Rate control with carvedilol 50 mg twice daily    Hypertension  Well controlled on Aldactone mg daily, lisinopril-HCTZ 20-25 mg daily and carvedilol 50 mg twice daily    PLAN:     Continue present medical therapy and anticoagulation for now.  Return to visit follow-up with Dr. Mata in October and can discuss stopping AC.  In the future, the patient would like to follow-up with Myra Kaur PA-C as he previously followed with her at [facility].       Orders this Visit:  Orders Placed This Encounter   Procedures    EKG 12-lead complete w/read - Clinics (performed today)     No orders of the defined types were placed in this encounter.    Medications Discontinued During This Encounter   Medication Reason    HYDROcodone-acetaminophen 5-325 MG per tablet Stopped by Patient (No AVS)    sertraline (ZOLOFT) 100 MG tablet Stopped by Patient (No AVS)  "   zolpidem (AMBIEN) 10 MG tablet Stopped by Patient (No AVS)    acetaminophen (TYLENOL) 325 MG tablet Stopped by Patient (No AVS)       Today's clinic visit entailed:  Review of prior external note(s) from - CareEverywhere information from [facility]  reviewed  Review of the result(s) of each unique test - Echo, EKG, ILR recording  The following tests were independently interpreted by me as noted in my documentation: EKG  I spent a total of 40 minutes on the day of the visit.   Time spent by me doing chart review, history and exam, documentation and further activities per the note  Provider  Link to [employer] Help Grid     The level of medical decision making during this visit was of moderate complexity.           Review of Systems:     Review of Systems:  Skin:  not assessed     Eyes:  not assessed    ENT:  not assessed    Respiratory:  Negative    Cardiovascular:    Positive for;palpitations  Gastroenterology: Positive for reflux  Genitourinary:  not assessed    Musculoskeletal:  not assessed    Neurologic:  Negative    Psychiatric:  not assessed    Heme/Lymph/Imm:  Negative    Endocrine:  Negative              Physical Exam:     Vitals: /73   Pulse 61   Resp 25   Ht 1.803 m (5' 11\")   Wt 133.8 kg (295 lb)   SpO2 98%   BMI 41.14 kg/m    Constitutional: Well nourished and in no apparent distress.  Eyes: Pupils equal, round.   HEENT: Normocephalic, atraumatic.   Neck: Supple.   Respiratory: Breathing non-labored. Lungs clear to auscultation bilaterally.  Cardiovascular:  Regular rate and rhythm, normal S1 and S2. No murmur   Skin: Warm, dry.   Extremities: No edema. No concerns with right femoral venous site  Neurologic: No gross motor deficits. Alert, awake, and oriented to person, place and time.  Psychiatric: Affect appropriate.        CURRENT MEDICATIONS:  Current Outpatient Medications   Medication Sig Dispense Refill    albuterol (PROAIR HFA/PROVENTIL HFA/VENTOLIN HFA) 108 (90 Base) " MCG/ACT inhaler Inhale 2 puffs into the lungs as needed      apixaban ANTICOAGULANT (ELIQUIS) 5 MG tablet Take 1 tablet (5 mg) by mouth 2 times daily 60 tablet 0    carvedilol (COREG) 25 MG tablet Take 50 mg by mouth 2 times daily      Coenzyme Q10 (COQ10 PO)       cyclobenzaprine (FLEXERIL) 5 MG tablet Take 10 mg by mouth as needed      lisinopril-hydrochlorothiazide (ZESTORETIC) 20-25 MG tablet Take 1 tablet by mouth daily      Multiple Vitamins-Minerals (CENTRUM ADULT PO)       Omega-3 Fatty Acids (FISH OIL PO)       spironolactone (ALDACTONE) 25 MG tablet Take 1 tablet by mouth daily      diltiazem ER (DILT-XR) 120 MG 24 hr capsule Take 1 capsule (120 mg) by mouth daily (Patient not taking: Reported on 2024) 30 capsule 11       ALLERGIES  No Known Allergies      PAST MEDICAL HISTORY:  Past Medical History:   Diagnosis Date    Anxiety     Difficult intubation     2024 at [facility] with an attempt to do an a.fib ablation    Hypertension 2011    Morbid obesity (H) 2011    Sleep apnea 2011    Uncomplicated asthma        PAST SURGICAL HISTORY:  Past Surgical History:   Procedure Laterality Date    EP ABLATION FOCAL AFIB   at [facility] w/ difficult intubtion - case aborted    EP ABLATION FOCAL AFIB N/A 2024    Procedure: Ablation Atrial Fibrilation;  Surgeon: Kaushal Mata MD;  Location:  HEART CARDIAC CATH LAB    EP LOOP RECORDER      ORIF right elbow         FAMILY HISTORY:  Family History   Problem Relation Age of Onset    Cerebrovascular Disease Maternal Grandfather     Hypertension Maternal Grandfather     Respiratory Mother         COPD       SOCIAL HISTORY:  Social History     Socioeconomic History    Marital status: Single   Tobacco Use    Smoking status: Former     Current packs/day: 0.00     Average packs/day: 2.0 packs/day for 18.0 years (36.0 ttl pk-yrs)     Types: Cigarettes     Start date: 1993     Quit date: 2011     Years since quittin.8    Smokeless  tobacco: Current    Tobacco comments:     Uses vape   Substance and Sexual Activity    Alcohol use: Not Currently     Alcohol/week: 1.7 standard drinks of alcohol     Types: 2 drink(s) per week    Drug use: Yes     Types: Marijuana     Comment: pt did this yesterday 7/7/24    Sexual activity: Yes     Partners: Female   Other Topics Concern    Exercise Yes     Comment: treadmill   Social History Narrative    ** Merged History Encounter **

## 2024-07-18 NOTE — LETTER
2024      To Whom it May Concern:    Max Patiño,  1980, sought medical care on the following dates that required absence from work:      for CT scan pre procedure    for cardiac procedure  - for recover post procedure   for post procedure visit    It was a pleasure to see you at your last clinic visit. Please do not hesitate to call me if you have any questions or concerns.    Sincerely,        Frances BRUCE, CNP

## 2024-07-26 ENCOUNTER — TELEPHONE (OUTPATIENT)
Dept: CARDIOLOGY | Facility: CLINIC | Age: 44
End: 2024-07-26
Payer: COMMERCIAL

## 2024-07-26 NOTE — TELEPHONE ENCOUNTER
Patient called and needs to be on a prednisone taper for 20 days d/t his chronic back pain.  He normally has steroid injections however patient s/p afib ablation on 7/8 and can not have interruptions with AC for 3 months.  Will review with Frances Hernández, DORITA.

## 2024-07-29 ENCOUNTER — TELEPHONE (OUTPATIENT)
Dept: CARDIOLOGY | Facility: CLINIC | Age: 44
End: 2024-07-29
Payer: COMMERCIAL

## 2024-07-29 DIAGNOSIS — I10 PRIMARY HYPERTENSION: Primary | ICD-10-CM

## 2024-07-29 RX ORDER — SPIRONOLACTONE 25 MG/1
25 TABLET ORAL DAILY
Qty: 90 TABLET | Refills: 3 | Status: SHIPPED | OUTPATIENT
Start: 2024-07-29 | End: 2024-07-29

## 2024-07-29 RX ORDER — SPIRONOLACTONE 25 MG/1
25 TABLET ORAL DAILY
Qty: 90 TABLET | Refills: 3 | Status: SHIPPED | OUTPATIENT
Start: 2024-07-29

## 2024-07-29 NOTE — TELEPHONE ENCOUNTER
He could be at increased risk for AF with PO steroids. Since his [id number] score is 1 he could interrupt AC in 1 month post ablation.     TEO Butler, CNP

## 2024-07-29 NOTE — TELEPHONE ENCOUNTER
7/29/24 Spoke w pt and explained recommendations from Frances Hernández NP    He could be at increased risk for AF with PO steroids. Since his TSO9AL-XAPu score is 1 he could interrupt AC in 1 month post ablation.      Frances Hernández, APRN, CNP    Pt stated he has already started the Prednisone taper . Explained that steroids may increase incidents of Afib breakthrough. Asked pt that if he has episodes> 2 hrs he should give us a call.  Advised that of he proceeds with steroid injection he should wait until after 8/8 and holding blood thinner would be ok as he has low risk d/t low CHADS-VASc score . If this is the case, asked pt to call back for hold instructions    Pt voiced understanding and agreement with plan.   Kamryn 945 am

## 2024-07-29 NOTE — TELEPHONE ENCOUNTER
Frances Hernández, DORITA approved refills for spironolactone.  Sent to [facility] on [address] [facility].  Spoke to patient to let him know this was sent.  Patient indicated that the pharmacy on [address] [facility] is closed and requested refill to be sent to the [facility] on [address] in [facility]. Resent to requested pharmacy.  SABINE Tse

## 2024-07-29 NOTE — TELEPHONE ENCOUNTER
Patient called requesting refill on spironolactone.  Recently seen by Frances Hernández, DORITA.  Will reach out to her to for approval for refill.  SABINE Tse

## 2024-08-07 DIAGNOSIS — I48.0 PAROXYSMAL ATRIAL FIBRILLATION (H): ICD-10-CM

## 2024-08-08 ENCOUNTER — TELEPHONE (OUTPATIENT)
Dept: CARDIOLOGY | Facility: CLINIC | Age: 44
End: 2024-08-08
Payer: COMMERCIAL

## 2024-08-08 DIAGNOSIS — I48.0 PAROXYSMAL ATRIAL FIBRILLATION (H): ICD-10-CM

## 2024-08-08 NOTE — TELEPHONE ENCOUNTER
Spoke to patient and suggested using a different location being he is out of the medication.  He is to locate a different pharmacy and get back to us.  Provided patient with direct number to clinic.  SABINE Tse

## 2024-08-08 NOTE — TELEPHONE ENCOUNTER
Health Call Center    Phone Message    May a detailed message be left on voicemail: yes     Reason for Call: Medication Question or concern regarding medication   Prescription Clarification  Name of Medication: apixaban ANTICOAGULANT (ELIQUIS) 5 MG tablet  Prescribing Provider: Esperanza   Pharmacy:    [id number] - [facility] [address] AT [facility] OF [address]     What on the order needs clarification? Patient went to pharmacy to  prescription, but Ivan is out of the medication so he was unable to get the prescription. He is completely out of the medication and is unsure how to move forward. He states he has Xarelto, which he took before being prescribed the Eliquis and wants to know if he can take that. Please reach out to patient to further assist.      Action Taken: Other: Cardiology    Travel Screening: Not Applicable     Thank you!  [facility]

## 2024-08-08 NOTE — TELEPHONE ENCOUNTER
Pt called asking for Eliquis to be sent to [facility] on [address].  Escript sent as requested. Pt will  today. Ebenezer

## 2024-10-08 ENCOUNTER — OFFICE VISIT (OUTPATIENT)
Dept: CARDIOLOGY | Facility: CLINIC | Age: 44
End: 2024-10-08
Payer: COMMERCIAL

## 2024-10-08 VITALS
HEIGHT: 71 IN | HEART RATE: 65 BPM | SYSTOLIC BLOOD PRESSURE: 118 MMHG | DIASTOLIC BLOOD PRESSURE: 68 MMHG | BODY MASS INDEX: 41.3 KG/M2 | RESPIRATION RATE: 14 BRPM | OXYGEN SATURATION: 94 % | WEIGHT: 295 LBS

## 2024-10-08 DIAGNOSIS — I48.0 PAF (PAROXYSMAL ATRIAL FIBRILLATION) (H): Primary | ICD-10-CM

## 2024-10-08 PROCEDURE — 99213 OFFICE O/P EST LOW 20 MIN: CPT | Performed by: INTERNAL MEDICINE

## 2024-10-08 PROCEDURE — 93000 ELECTROCARDIOGRAM COMPLETE: CPT | Performed by: INTERNAL MEDICINE

## 2024-10-08 NOTE — LETTER
"10/8/2024    Provider Not In System       RE: Max Patiño       Dear Colleague,     I had the pleasure of seeing Max Patiño in the [facility].    Electrophysiology Clinic Progress Note    Max Patiño MRN# [id number]   YOB: 1980 Age: 44 year old     Primary cardiologist:          Assessment and Plan   Delightful 43 yo male with symptomatic paroxysmal AF past several years in the background of HTN, normal LVEF, obesity and SALVADOR whom had an aborted EP [place] several [place] ago [place] [facility] [place] to his oropharynx during intubation. Since then sxs more frequent with palpitations and sob. Recent ECG shows AFL with RVR. Echo pending. 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      When I saw him last May we agreed to proceed with ablation given his young age. Pt underwent PVI and empiric ablation of the CTI given h/o typical AFL 3 months ago. He noted palpitations similar to AF for an hour or two the first several weeks post ablation but since then the burden has lessened. Ecg today shows nsr. Pt is scheduled to have steroid injection in his back for his chronic back pain and was told to hold Eliquis 3 days before procedure. He can hold eliquis however many days are needed and resume it after when deems safe. I would continue Eliquis for another 6 months given ongoing ?AF recurrence. 7 or 14 days zio if continues to have sxs. See chante in 6 mo.             Review of Systems     12-pt ROS is negative except for as noted in the HPI.          Physical Exam     Vitals: /68   Pulse 65   Resp 14   Ht 1.803 m (5' 11\")   Wt 133.8 kg (295 lb)   SpO2 94%   BMI 41.14 kg/m    Wt Readings from Last 10 Encounters:   10/08/24 133.8 kg (295 lb)   07/18/24 133.8 kg (295 lb)   07/08/24 131.4 kg (289 lb 9.6 oz)   05/30/24 132 kg (291 lb)   05/24/24 135.9 kg (299 lb 9.6 oz)   07/17/13 122.5 kg (270 lb)   07/11/13 125.9 kg (277 lb 9.6 oz)   08/08/12 129.7 kg (286 " "lb)   06/21/12 131.1 kg (289 lb)   05/02/12 (!) 142 kg (313 lb)       Constitutional:  Patient is pleasant, alert, cooperative, and in NAD.  HEENT:  NCAT. PERRLA. EOM's intact.   Neck:  CVP appears normal. No carotid bruits.   Pulmonary: Normal respiratory effort. CTAB.   Cardiac: RRR, normal S1/S2, no S3/S4, no murmur or rub.   Abdomen:  Non-tender abdomen, no hepatosplenomegaly appreciated.   Vascular: Pulses in the upper and lower extremities are 2+ and equal bilaterally.  Extremities: No edema, erythema, cyanosis or tenderness appreciated.  Skin:  No rashes or lesions appreciated.   Neurological:  No gross motor or sensory deficits.   Psych: Appropriate affect.          Data   Labs reviewed:  No lab results found.    Invalid input(s): \"CMP\", \"CBC\"    Lab Results   Component Value Date    WBC 10.7 07/08/2024    WBC 10.0 12/14/2011    RBC 4.19 (L) 07/08/2024    RBC 4.73 12/14/2011    HGB 13.0 (L) 07/08/2024    HGB 14.9 12/14/2011    HCT 39.7 (L) 07/08/2024    HCT 44.6 12/14/2011    MCV 95 07/08/2024    MCV 94 12/14/2011    MCH 31.0 07/08/2024    MCH 31.5 12/14/2011    MCHC 32.7 07/08/2024    MCHC 33.4 12/14/2011    RDW 12.7 07/08/2024    RDW 13.1 12/14/2011     07/08/2024     12/14/2011       Lab Results   Component Value Date     07/08/2024     12/28/2011    POTASSIUM 4.8 07/08/2024    POTASSIUM 4.1 12/28/2011    CHLORIDE 103 07/08/2024    CHLORIDE 104 12/28/2011    CO2 27 07/08/2024    CO2 30 12/28/2011    ANIONGAP 8 07/08/2024    ANIONGAP 9 12/28/2011    GLC 97 07/08/2024    GLC 88 12/28/2011    BUN 14.0 07/08/2024    BUN 17 12/28/2011    CR 0.97 07/08/2024    CR 0.98 12/28/2011    GFRESTIMATED >90 07/08/2024    GFRESTIMATED 88 12/28/2011    GFRESTBLACK >90 12/28/2011    JANNETTE 9.6 07/08/2024    JANNETTE 9.5 12/28/2011      Lab Results   Component Value Date    AST 31 12/14/2011    ALT 31 12/14/2011       No results found for: \"A1C\"    No results found for: \"INR\"         Problem List "     Patient Active Problem List   Diagnosis     Sleep apnea     Morbid obesity (H)     Hypertension     Paroxysmal atrial fibrillation (H)            Medications     Current Outpatient Medications   Medication Sig Dispense Refill     albuterol (PROAIR HFA/PROVENTIL HFA/VENTOLIN HFA) 108 (90 Base) MCG/ACT inhaler Inhale 2 puffs into the lungs as needed       apixaban ANTICOAGULANT (ELIQUIS) 5 MG tablet Take 1 tablet (5 mg) by mouth 2 times daily 180 tablet 3     carvedilol (COREG) 25 MG tablet Take 50 mg by mouth 2 times daily       Coenzyme Q10 (COQ10 PO)        cyclobenzaprine (FLEXERIL) 5 MG tablet Take 10 mg by mouth as needed       lisinopril-hydrochlorothiazide (ZESTORETIC) 20-25 MG tablet Take 1 tablet by mouth daily       Multiple Vitamins-Minerals (CENTRUM ADULT PO)        Omega-3 Fatty Acids (FISH OIL PO)        diltiazem ER (DILT-XR) 120 MG 24 hr capsule Take 1 capsule (120 mg) by mouth daily (Patient not taking: Reported on 5/30/2024) 30 capsule 11     spironolactone (ALDACTONE) 25 MG tablet Take 1 tablet (25 mg) by mouth daily 90 tablet 3            Past Medical History     Past Medical History:   Diagnosis Date     Anxiety      Difficult intubation     11/2024 at [facility] with an attempt to do an a.fib ablation     Hypertension 12/14/2011     Morbid obesity (H) 12/14/2011     Sleep apnea 12/14/2011     Uncomplicated asthma      Past Surgical History:   Procedure Laterality Date     EP ABLATION FOCAL AFIB  2023 11/23 at [facility] w/ difficult intubtion - case aborted     EP ABLATION FOCAL AFIB N/A 7/8/2024    Procedure: Ablation Atrial Fibrilation;  Surgeon: Kaushal Mata MD;  Location: [facility] CARDIAC CATH LAB     EP LOOP RECORDER       ORIF right elbow       Family History   Problem Relation Age of Onset     Cerebrovascular Disease Maternal Grandfather      Hypertension Maternal Grandfather      Respiratory Mother         COPD     Social History     Socioeconomic History     Marital status: Single      Spouse name: Not on file     Number of children: Not on file     Years of education: Not on file     Highest education level: Not on file   Occupational History     Not on file   Tobacco Use     Smoking status: Former     Current packs/day: 0.00     Average packs/day: 2.0 packs/day for 18.0 years (36.0 ttl pk-yrs)     Types: Cigarettes     Start date: 1993     Quit date: 2011     Years since quittin.1     Smokeless tobacco: Current     Tobacco comments:     Uses vape   Substance and Sexual Activity     Alcohol use: Not Currently     Alcohol/week: 1.7 standard drinks of alcohol     Types: 2 drink(s) per week     Drug use: Yes     Types: Marijuana     Comment: pt did this yesterday 24     Sexual activity: Yes     Partners: Female   Other Topics Concern      Service Not Asked     Blood Transfusions Not Asked     Caffeine Concern Not Asked     Occupational Exposure Not Asked     Hobby Hazards Not Asked     Sleep Concern Not Asked     Stress Concern Not Asked     Weight Concern Not Asked     Special Diet Not Asked     Back Care Not Asked     Exercise Yes     Comment: treadmill     Bike Helmet Not Asked     Seat Belt Not Asked     Self-Exams Not Asked   Social History Narrative    ** Merged History Encounter **          Social Determinants of Health     Financial Resource Strain: Not on file   Food Insecurity: Not on file   Transportation Needs: Not on file   Physical Activity: Not on file   Stress: Not on file   Social Connections: Not on file   Interpersonal Safety: Not on file   Housing Stability: Not on file            Allergies   Patient has no known allergies.    Today's clinic visit entailed:  The following tests were independently interpreted by me as noted in my documentation: ecg  30 minutes spent by me on the date of the encounter doing chart review, history and exam, documentation and further activities per the note  Provider  Link to [facility] Help Grid     The level of medical decision  making during this visit was of moderate complexity.     Thank you for allowing me to participate in the care of your patient.      Sincerely,     Kaushal Roldan MD     [facility]  cc:   No referring provider defined for this encounter.

## 2024-10-09 NOTE — PROGRESS NOTES
"  Electrophysiology Clinic Progress Note    Max Patiño MRN# [id number]   YOB: 1980 Age: 44 year old     Primary cardiologist:          Assessment and Plan   Delightful 45 yo male with symptomatic paroxysmal AF past several years in the background of HTN, normal LVEF, obesity and SALVADOR whom had an aborted EP study several months ago at [facility] after  injury to his oropharynx during intubation. Since then sxs more frequent with palpitations and sob. Recent ECG shows AFL with RVR. Echo pending. 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      When I saw him last May we agreed to proceed with ablation given his young age. Pt underwent PVI and empiric ablation of the CTI given h/o typical AFL 3 months ago. He noted palpitations similar to AF for an hour or two the first several weeks post ablation but since then the burden has lessened. Ecg today shows nsr. Pt is scheduled to have steroid injection in his back for his chronic back pain and was told to hold Eliquis 3 days before procedure. He can hold eliquis however many days are needed and resume it after when deems safe. I would continue Eliquis for another 6 months given ongoing ?AF recurrence. 7 or 14 days zio if continues to have sxs. See chante in 6 mo.             Review of Systems     12-pt ROS is negative except for as noted in the HPI.          Physical Exam     Vitals: /68   Pulse 65   Resp 14   Ht 1.803 m (5' 11\")   Wt 133.8 kg (295 lb)   SpO2 94%   BMI 41.14 kg/m    Wt Readings from Last 10 Encounters:   10/08/24 133.8 kg (295 lb)   07/18/24 133.8 kg (295 lb)   07/08/24 131.4 kg (289 lb 9.6 oz)   05/30/24 132 kg (291 lb)   05/24/24 135.9 kg (299 lb 9.6 oz)   07/17/13 122.5 kg (270 lb)   07/11/13 125.9 kg (277 lb 9.6 oz)   08/08/12 129.7 kg (286 lb)   06/21/12 131.1 kg (289 lb)   05/02/12 (!) 142 kg (313 lb)       Constitutional:  Patient is pleasant, alert, cooperative, and in NAD.  HEENT:  NCAT. PERRLA. EOM's " "intact.   Neck:  CVP appears normal. No carotid bruits.   Pulmonary: Normal respiratory effort. CTAB.   Cardiac: RRR, normal S1/S2, no S3/S4, no murmur or rub.   Abdomen:  Non-tender abdomen, no hepatosplenomegaly appreciated.   Vascular: Pulses in the upper and lower extremities are 2+ and equal bilaterally.  Extremities: No edema, erythema, cyanosis or tenderness appreciated.  Skin:  No rashes or lesions appreciated.   Neurological:  No gross motor or sensory deficits.   Psych: Appropriate affect.          Data   Labs reviewed:  No lab results found.    Invalid input(s): \"CMP\", \"CBC\"    Lab Results   Component Value Date    WBC 10.7 07/08/2024    WBC 10.0 12/14/2011    RBC 4.19 (L) 07/08/2024    RBC 4.73 12/14/2011    HGB 13.0 (L) 07/08/2024    HGB 14.9 12/14/2011    HCT 39.7 (L) 07/08/2024    HCT 44.6 12/14/2011    MCV 95 07/08/2024    MCV 94 12/14/2011    MCH 31.0 07/08/2024    MCH 31.5 12/14/2011    MCHC 32.7 07/08/2024    MCHC 33.4 12/14/2011    RDW 12.7 07/08/2024    RDW 13.1 12/14/2011     07/08/2024     12/14/2011       Lab Results   Component Value Date     07/08/2024     12/28/2011    POTASSIUM 4.8 07/08/2024    POTASSIUM 4.1 12/28/2011    CHLORIDE 103 07/08/2024    CHLORIDE 104 12/28/2011    CO2 27 07/08/2024    CO2 30 12/28/2011    ANIONGAP 8 07/08/2024    ANIONGAP 9 12/28/2011    GLC 97 07/08/2024    GLC 88 12/28/2011    BUN 14.0 07/08/2024    BUN 17 12/28/2011    CR 0.97 07/08/2024    CR 0.98 12/28/2011    GFRESTIMATED >90 07/08/2024    GFRESTIMATED 88 12/28/2011    GFRESTBLACK >90 12/28/2011    JANNETTE 9.6 07/08/2024    JANNETTE 9.5 12/28/2011      Lab Results   Component Value Date    AST 31 12/14/2011    ALT 31 12/14/2011       No results found for: \"A1C\"    No results found for: \"INR\"         Problem List     Patient Active Problem List   Diagnosis    Sleep apnea    Morbid obesity (H)    Hypertension    Paroxysmal atrial fibrillation (H)            Medications     Current " Outpatient Medications   Medication Sig Dispense Refill    albuterol (PROAIR HFA/PROVENTIL HFA/VENTOLIN HFA) 108 (90 Base) MCG/ACT inhaler Inhale 2 puffs into the lungs as needed      apixaban ANTICOAGULANT (ELIQUIS) 5 MG tablet Take 1 tablet (5 mg) by mouth 2 times daily 180 tablet 3    carvedilol (COREG) 25 MG tablet Take 50 mg by mouth 2 times daily      Coenzyme Q10 (COQ10 PO)       cyclobenzaprine (FLEXERIL) 5 MG tablet Take 10 mg by mouth as needed      lisinopril-hydrochlorothiazide (ZESTORETIC) 20-25 MG tablet Take 1 tablet by mouth daily      Multiple Vitamins-Minerals (CENTRUM ADULT PO)       Omega-3 Fatty Acids (FISH OIL PO)       diltiazem ER (DILT-XR) 120 MG 24 hr capsule Take 1 capsule (120 mg) by mouth daily (Patient not taking: Reported on 5/30/2024) 30 capsule 11    spironolactone (ALDACTONE) 25 MG tablet Take 1 tablet (25 mg) by mouth daily 90 tablet 3            Past Medical History     Past Medical History:   Diagnosis Date    Anxiety     Difficult intubation     11/2024 at [facility] with an attempt to do an a.fib ablation    Hypertension 12/14/2011    Morbid obesity (H) 12/14/2011    Sleep apnea 12/14/2011    Uncomplicated asthma      Past Surgical History:   Procedure Laterality Date    EP ABLATION FOCAL AFIB  2023 11/23 at [facility] w/ difficult intubtion - case aborted    EP ABLATION FOCAL AFIB N/A 7/8/2024    Procedure: Ablation Atrial Fibrilation;  Surgeon: Kaushal Mata MD;  Location:  HEART CARDIAC CATH LAB    EP LOOP RECORDER      ORIF right elbow       Family History   Problem Relation Age of Onset    Cerebrovascular Disease Maternal Grandfather     Hypertension Maternal Grandfather     Respiratory Mother         COPD     Social History     Socioeconomic History    Marital status: Single     Spouse name: Not on file    Number of children: Not on file    Years of education: Not on file    Highest education level: Not on file   Occupational History    Not on file   Tobacco Use    Smoking  status: Former     Current packs/day: 0.00     Average packs/day: 2.0 packs/day for 18.0 years (36.0 ttl pk-yrs)     Types: Cigarettes     Start date: 1993     Quit date: 2011     Years since quittin.1    Smokeless tobacco: Current    Tobacco comments:     Uses vape   Substance and Sexual Activity    Alcohol use: Not Currently     Alcohol/week: 1.7 standard drinks of alcohol     Types: 2 drink(s) per week    Drug use: Yes     Types: Marijuana     Comment: pt did this yesterday 24    Sexual activity: Yes     Partners: Female   Other Topics Concern     Service Not Asked    Blood Transfusions Not Asked    Caffeine Concern Not Asked    Occupational Exposure Not Asked    Hobby Hazards Not Asked    Sleep Concern Not Asked    Stress Concern Not Asked    Weight Concern Not Asked    Special Diet Not Asked    Back Care Not Asked    Exercise Yes     Comment: treadmill    Bike Helmet Not Asked    Seat Belt Not Asked    Self-Exams Not Asked   Social History Narrative    ** Merged History Encounter **          Social Determinants of Health     Financial Resource Strain: Not on file   Food Insecurity: Not on file   Transportation Needs: Not on file   Physical Activity: Not on file   Stress: Not on file   Social Connections: Not on file   Interpersonal Safety: Not on file   Housing Stability: Not on file            Allergies   Patient has no known allergies.    Today's clinic visit entailed:  The following tests were independently interpreted by me as noted in my documentation: ecg  30 minutes spent by me on the date of the encounter doing chart review, history and exam, documentation and further activities per the note  Provider  Link to [facility] Help Grid     The level of medical decision making during this visit was of moderate complexity.

## 2024-10-24 DIAGNOSIS — I10 PRIMARY HYPERTENSION: ICD-10-CM

## 2024-10-24 RX ORDER — SPIRONOLACTONE 25 MG/1
25 TABLET ORAL DAILY
Qty: 90 TABLET | Refills: 3 | Status: SHIPPED | OUTPATIENT
Start: 2024-10-24

## 2024-11-11 ENCOUNTER — TELEPHONE (OUTPATIENT)
Dept: CARDIOLOGY | Facility: CLINIC | Age: 44
End: 2024-11-11
Payer: COMMERCIAL

## 2024-11-11 DIAGNOSIS — I48.0 PAROXYSMAL ATRIAL FIBRILLATION (H): Primary | ICD-10-CM

## 2024-11-11 NOTE — TELEPHONE ENCOUNTER
[facility] Call Center    Phone Message    May a detailed message be left on voicemail: yes     Reason for Call: Other: Pt would like to speak to someone. Pt stated they are experiencing symptoms and would just like to speak to someone from their care team to see if they need to make an appt.      Action Taken: Other: Cardiology     Travel Screening: Not Applicable     Thank you!  [facility]

## 2024-11-11 NOTE — TELEPHONE ENCOUNTER
11/11/24 Spoke w pt who reports he continues to have palpitations for 2-4-7 hours each day since his last appt with Dr Mata on 10/08. Today he stayed home from work as they were very bothersome.  He verifies he is taking   Xalelto  Lisinopril  Spironolactone  Carvedilol      He has not started Diltiazem    He is wondering about wearing a ZP that was discussed at LOV w DR Mata      Routing to Dr Mata for review, will call pt back w cherelle Harry 1152 am

## 2024-11-11 NOTE — LETTER
[facility]  [address]  [place] [address]  Phone: [phone]  Fax: [phone]             11/12/24    RE: Max Patiño  [address] [address]           To whom it may concern:      Max Patiño, who is under my care for a heart arrhythmia . Pt need to be off work yesterday (11/11/24), due to is symptomatic arrhythmia and today (11/12/24) to have heart monitor placed.  Pt may return to work with no restrictions.  Any questions please call [phone].        Sincerely,          Kaushal Mata MD

## 2024-11-11 NOTE — TELEPHONE ENCOUNTER
11/11/24 Msg recd from Dr Mata      3 or 7 days zio depending of freq       Spoke w pt who would like to come in tomorrow for placement. He can be here at 215.  Spot on hold, message sent to scheduling  Time okayed by avelino Nweberry placed  Kamryn 210 pm

## 2024-11-12 ENCOUNTER — ALLIED HEALTH/NURSE VISIT (OUTPATIENT)
Dept: CARDIOLOGY | Facility: CLINIC | Age: 44
End: 2024-11-12
Attending: INTERNAL MEDICINE
Payer: COMMERCIAL

## 2024-11-12 DIAGNOSIS — I48.0 PAROXYSMAL ATRIAL FIBRILLATION (H): ICD-10-CM

## 2024-11-12 NOTE — PROGRESS NOTES
Max Patiño arrived here on 11/12/2024 2:50 PM for 3-7 Days  Zio monitor placement per ordering provider Dr. Mata for the diagnosis Paroxysmal atrial fibrillation. Patient s skin was prepped per protocol. Dr. Mata is the supervising MD.  Zio monitor was placed.  Instructions were reviewed with and given to the patient.  Patient verbalized understanding of wear, troubleshooting and monitor return instructions.    Vinita Stanton, CMA

## 2024-11-12 NOTE — TELEPHONE ENCOUNTER
Pt requested letter for his work so that he can go back to work after 2 days off with his A Fib.  Letter written, signed and emailed to patient.  JNJosue

## 2024-11-13 NOTE — TELEPHONE ENCOUNTER
11/13/24 Pt called requesting signed letter be faxed to [employer] attn Brown Conde at F [phone] which I did. He stated he was not able to access the letter via email. Sent copy of letter to pt in US Mail  KHerroRN 250 pm

## 2024-11-15 NOTE — ANESTHESIA PROCEDURE NOTES
Airway       Patient location during procedure: OR       Procedure Start/Stop Times: 7/8/2024 12:13 PM  Staff -        Anesthesiologist:  Salina Gambino MD       CRNA: Jean Claude Alejandre APRN CRNA       Performed By: CRNA  Consent for Airway        Urgency: elective  Indications and Patient Condition       Indications for airway management: hugo-procedural       Induction type:intravenous       Mask difficulty assessment: 1 - vent by mask    Final Airway Details       Final airway type: endotracheal airway       Successful airway: ETT - single  Endotracheal Airway Details        ETT size (mm): 8.0       Cuffed: yes       Cuff volume (mL): 8       Successful intubation technique: video laryngoscopy       VL Blade Size: Lechuga 4       Grade View of Cords: 1       Adjucts: stylet       Position: Center       Measured from: lips       Secured at (cm): 22    Post intubation assessment        Placement verified by: capnometry and equal breath sounds        Number of attempts at approach: 1       Secured with: tape       Ease of procedure: easy       Dentition: Intact and Unchanged    Medication(s) Administered   Medication Administration Time: 7/8/2024 12:13 PM         yes

## 2024-11-26 ENCOUNTER — TELEPHONE (OUTPATIENT)
Dept: CARDIOLOGY | Facility: CLINIC | Age: 44
End: 2024-11-26
Payer: COMMERCIAL

## 2024-11-26 DIAGNOSIS — I48.0 PAROXYSMAL ATRIAL FIBRILLATION (H): Primary | ICD-10-CM

## 2024-11-26 LAB — CV ZIO PRELIM RESULTS: NORMAL

## 2024-11-26 NOTE — TELEPHONE ENCOUNTER
[facility] called with Abnormal results :    Pt is S/P afib ablation  7/8/2024      Pt had episode of MOLLY 11/15/2024 @ 3:40 pm with V rates 191bpm x 60 seconds  Page 12/13 strip 7    Pt states he feels palpitations off and on every day. States at times he is SOB.  States these episodes are worse now than they were before his ablation 7/8/2024    Pt takes Coreg, Dilt and Eliquis    Will forward to Afib RN's for review  Preliminary results posted.    Sofia REGALADO

## 2024-11-26 NOTE — TELEPHONE ENCOUNTER
Spoke to pt and made him aware the one of the episodes of A Fib last over and hour and AVG rate was 134 bpm. Pt stats that he feels it everyday and pre the ZP looks to have episodes off and on daily.  Pt continues on the Coreg and Eliquis, but never started the Diltiazem.  Pt made aware the Dr Mata is back in the office on monday and will have him review and provide recommendations. Pt states understanding. JNelsonRLINDA

## 2024-11-30 LAB — CV ZIO PRELIM RESULTS: NORMAL

## 2024-12-02 NOTE — TELEPHONE ENCOUNTER
Ablation done last May. Hopefully over time AF become less. For now we can start Flecainide 100 mg bid. ECG in 3-5 days.     LM for pt to call back to discuss recommendations per Dr Mata. Ebenezer

## 2024-12-03 RX ORDER — FLECAINIDE ACETATE 100 MG/1
100 TABLET ORAL 2 TIMES DAILY
Qty: 60 TABLET | Refills: 4 | Status: SHIPPED | OUTPATIENT
Start: 2024-12-03

## 2024-12-03 NOTE — TELEPHONE ENCOUNTER
Spoke to pt and made him aware of his ZP findings showing the he was still in A fib 11% of the time with longest a little over on hour and AVG rate 134.  Discussed that Dr Mata would like pt to start on Flecainide 100 mg twice daily and then have an EKG in 3-5 days.  Discussed what the medication is for and that the EKG is to make sure that his heart is tolerating the medication.  Pt states understanding is ok to start and will  sometime tomorrow. Pt will then have an EKG done 12/9 at 1030. Escript sent and will have scheduling put pt on the EKG schedule. Ebenezer

## 2024-12-09 DIAGNOSIS — I48.0 PAROXYSMAL ATRIAL FIBRILLATION (H): ICD-10-CM

## 2024-12-09 NOTE — TELEPHONE ENCOUNTER
Ambulatory EKG completed today.  Patient started flecainide 100mg po bid on 12/3.  Will have Dr Hester review in Dr Mata's absence.  SABINE Tse

## 2024-12-09 NOTE — LETTER
[facility]  [address]  [place] [address]  Phone: [phone]  Fax: [phone]         To whom this may concern,      This letter is to verify that Max Patiño was at an appointment today. Please excuse his absence from work.

## 2025-01-10 ENCOUNTER — ANCILLARY PROCEDURE (OUTPATIENT)
Dept: GENERAL RADIOLOGY | Facility: CLINIC | Age: 45
End: 2025-01-10
Attending: PHYSICIAN ASSISTANT
Payer: COMMERCIAL

## 2025-01-10 DIAGNOSIS — I48.91 ATRIAL FIBRILLATION, UNSPECIFIED TYPE (H): ICD-10-CM

## 2025-01-10 DIAGNOSIS — R05.1 ACUTE COUGH: ICD-10-CM

## 2025-01-10 DIAGNOSIS — R11.10 VOMITING AND DIARRHEA: ICD-10-CM

## 2025-01-10 DIAGNOSIS — Z79.01 LONG TERM CURRENT USE OF ANTICOAGULANT THERAPY: ICD-10-CM

## 2025-01-10 DIAGNOSIS — R52 BODY ACHES: ICD-10-CM

## 2025-01-10 DIAGNOSIS — R19.7 VOMITING AND DIARRHEA: ICD-10-CM

## 2025-01-10 PROCEDURE — 71046 X-RAY EXAM CHEST 2 VIEWS: CPT | Mod: TC | Performed by: RADIOLOGY

## 2025-01-11 DIAGNOSIS — I10 PRIMARY HYPERTENSION: Primary | ICD-10-CM

## 2025-01-11 RX ORDER — LISINOPRIL AND HYDROCHLOROTHIAZIDE 20; 25 MG/1; MG/1
1 TABLET ORAL DAILY
Qty: 90 TABLET | Refills: 3 | Status: SHIPPED | OUTPATIENT
Start: 2025-01-11 | End: 2026-01-11

## 2025-02-27 ENCOUNTER — TELEPHONE (OUTPATIENT)
Dept: CARDIOLOGY | Facility: CLINIC | Age: 45
End: 2025-02-27

## 2025-02-27 DIAGNOSIS — I10 PRIMARY HYPERTENSION: Primary | ICD-10-CM

## 2025-02-27 DIAGNOSIS — I48.0 PAROXYSMAL ATRIAL FIBRILLATION (H): ICD-10-CM

## 2025-02-27 NOTE — TELEPHONE ENCOUNTER
[facility] Call Center    Phone Message    May a detailed message be left on voicemail: yes     Reason for Call: Other: Per call from patient, he was given  cyclobenzaprine (FLEXERIL) 5 MG tablet for pain and before taking it, he would like speak with care team to make sure it's safe as he takes multiple medications for his heart. Please reach out to patient. Thanks    Action Taken: Other: Cardio    Travel Screening: Not Applicable     Date of Service:

## 2025-02-28 NOTE — TELEPHONE ENCOUNTER
Message left for patent to review recommendations per Dr Mata.  Waiting call back from patient.  SABINE Tse

## 2025-02-28 NOTE — TELEPHONE ENCOUNTER
Per Dr Mata:  Flexeril can interact with flecainide. He can stop flecainide for 3 days before starting flexeril.

## 2025-03-01 NOTE — TELEPHONE ENCOUNTER
Spoke to pt and made him aware that he could take the Flexeril after stopping the Flecainide 3 days prior.  Pt states that he is still having episodes 2-3 days. Some days it is a 1/2 a day some all day. Pt states that he does not have pain, but he just feels that his chest is tight feeling.  Pt was to have seen an DORITA next month, but this is not scheduled. Will make Dr Mata aware and get his recommendation of what else can be done for pt Fib. Will then get back to pt on Monday with a plan.  Ebenezer

## 2025-03-10 RX ORDER — FLECAINIDE ACETATE 150 MG/1
150 TABLET ORAL 2 TIMES DAILY
Qty: 180 TABLET | Refills: 1 | Status: SHIPPED | OUTPATIENT
Start: 2025-03-10

## 2025-03-10 RX ORDER — CARVEDILOL 25 MG/1
50 TABLET ORAL 2 TIMES DAILY
Qty: 180 TABLET | Refills: 2 | Status: SHIPPED | OUTPATIENT
Start: 2025-03-10

## 2025-03-10 NOTE — TELEPHONE ENCOUNTER
Per : He can increase flecainide to 150 mg bid for now. Pfa if still having afib.    Spoke with pt regarding these recommendations and pt agreeable to plan. Will inc dose and reach out in a week or two if no improvement and will discuss moving forward with pfa at that time. Pt also asking in case his work would ask, if we would be able to provide any sort of letter stating that he has been having these episodes causing him to have to stay home today. Instructed him to let us know if they do ask for something and then we can go from there. Pt verbalized understanding and denied questions at this time. Updated prescription sent to pharmacy. SABINE Bryant

## 2025-03-10 NOTE — TELEPHONE ENCOUNTER
"Spoke with pt regarding  recommendations. Pt then informed me that he has not been taking the Diltiazem and never has, didn't  prescription when it was originally prescribed. Pt states he would be open to the PFA if  is recommending it and thinks it would work as his previous 2 ablation attempts didn't work, per pt. Informed him I would reach out to  since we were unaware that pt wasn't taking the Diltiazem and see if he would still recommend meds vs ablation. Pt states this AM he had a \"bad episode\" that caused him to need to stay home from work due to high HR and dizziness. States he is no longer in afib at time of call. Will get back to pt with dr bridges recommendations.     Pt also states he is out of Coreg and needing a refill. Reviewed [facility] note from 7/24 addressing med and recommended to continue regimen at that time, refill sent to [facility].     SABINE Bryant   "

## 2025-03-19 ENCOUNTER — OFFICE VISIT (OUTPATIENT)
Dept: FAMILY MEDICINE | Facility: CLINIC | Age: 45
End: 2025-03-19
Payer: COMMERCIAL

## 2025-03-19 VITALS
HEIGHT: 71 IN | DIASTOLIC BLOOD PRESSURE: 85 MMHG | HEART RATE: 63 BPM | BODY MASS INDEX: 42.56 KG/M2 | TEMPERATURE: 97.3 F | RESPIRATION RATE: 20 BRPM | OXYGEN SATURATION: 95 % | WEIGHT: 304 LBS | SYSTOLIC BLOOD PRESSURE: 136 MMHG

## 2025-03-19 DIAGNOSIS — R11.0 NAUSEA: Primary | ICD-10-CM

## 2025-03-19 DIAGNOSIS — R19.7 DIARRHEA OF PRESUMED INFECTIOUS ORIGIN: ICD-10-CM

## 2025-03-19 PROCEDURE — 3079F DIAST BP 80-89 MM HG: CPT | Performed by: PHYSICIAN ASSISTANT

## 2025-03-19 PROCEDURE — 99203 OFFICE O/P NEW LOW 30 MIN: CPT | Performed by: PHYSICIAN ASSISTANT

## 2025-03-19 PROCEDURE — 3075F SYST BP GE 130 - 139MM HG: CPT | Performed by: PHYSICIAN ASSISTANT

## 2025-03-19 PROCEDURE — 1125F AMNT PAIN NOTED PAIN PRSNT: CPT | Performed by: PHYSICIAN ASSISTANT

## 2025-03-19 RX ORDER — ONDANSETRON 4 MG/1
4 TABLET, ORALLY DISINTEGRATING ORAL EVERY 8 HOURS PRN
Qty: 6 TABLET | Refills: 0 | Status: SHIPPED | OUTPATIENT
Start: 2025-03-19 | End: 2025-03-29

## 2025-03-19 ASSESSMENT — PATIENT HEALTH QUESTIONNAIRE - PHQ9
SUM OF ALL RESPONSES TO PHQ QUESTIONS 1-9: 8
SUM OF ALL RESPONSES TO PHQ QUESTIONS 1-9: 8
10. IF YOU CHECKED OFF ANY PROBLEMS, HOW DIFFICULT HAVE THESE PROBLEMS MADE IT FOR YOU TO DO YOUR WORK, TAKE CARE OF THINGS AT HOME, OR GET ALONG WITH OTHER PEOPLE: SOMEWHAT DIFFICULT

## 2025-03-19 ASSESSMENT — ENCOUNTER SYMPTOMS: NAUSEA: 1

## 2025-03-19 ASSESSMENT — PAIN SCALES - GENERAL: PAINLEVEL_OUTOF10: MODERATE PAIN (4)

## 2025-03-19 NOTE — PROGRESS NOTES
"  Assessment & Plan     Nausea  - ondansetron (ZOFRAN ODT) 4 MG ODT tab; Take 1 tablet (4 mg) by mouth every 8 hours as needed for nausea.    Diarrhea of presumed infectious origin  Patient education provided for management of acute diarrheal illness.  Recommend he recheck if symptoms were to worsen, if any blood noted in the stools, significant abdominal pain or fever develops.          BMI  Estimated body mass index is 42.4 kg/m  as calculated from the following:    Height as of this encounter: 1.803 m (5' 11\").    Weight as of this encounter: 137.9 kg (304 lb).             Glenis Santana is a 44 year old, presenting for the following health issues: Patient reports approximately week ago he developed what he suspects with norovirus with noted diarrhea and mild upset stomach.  He denies any fever denies any vomiting.  Reports that he still feeling somewhat nauseous although the diarrhea has significantly improved and his stools are starting to form again.  He has been able to drink fluids.  He would like to return to work but needs a note to do so.  He denies any other new symptoms at this time.  Of note patient is seeking a new primary care.  Nausea      3/19/2025    10:45 AM   Additional Questions   Roomed by Nubia   Accompanied by Self     Nausea  Associated symptoms include nausea.   History of Present Illness       Reason for visit:  Nausea and pressure/pain in my face  Symptom onset:  1-2 weeks ago  Symptoms include:  Nausea and general discomfort  Symptom intensity:  Moderate  Symptom progression:  Improving  Had these symptoms before:  No  What makes it worse:  No  What makes it better:  No   He is taking medications regularly.                      Objective    /85 (BP Location: Left arm, Patient Position: Chair, Cuff Size: Adult Large)   Pulse 63   Temp 97.3  F (36.3  C) (Temporal)   Resp 20   Ht 1.803 m (5' 11\")   Wt (!) 137.9 kg (304 lb)   SpO2 95%   BMI 42.40 kg/m    Body mass index is " 42.4 kg/m .  Physical Exam   GENERAL: alert and no distress  HENT: normal cephalic/atraumatic, nose and mouth without ulcers or lesions, oropharynx clear, and oral mucous membranes moist  RESP: Lung sounds are distant due to body habitus, lungs are clear to auscultation - no rales, rhonchi or wheezes  CV: regular rate and rhythm, normal S1 S2, no S3 or S4, no murmur, click or rub, no peripheral edema   ABDOMEN: Morbidly obese, soft, nontender, no hepatosplenomegaly, no masses and bowel sounds normal            Signed Electronically by: Anali Ya PA-C

## 2025-03-19 NOTE — PATIENT INSTRUCTIONS
At [facility], we strive to deliver an exceptional experience to you, every time we see you. If you receive a survey, please let us know what we are doing well and/or what we could improve upon, as we do value your feedback.  If you have MyChart, you can expect to receive results automatically within 24 hours of their completion.  Your provider will send a note interpreting your results as well.   If you do not have MyChart, you should receive your results in about a week by mail.    Your care team:                            Family Medicine Internal Medicine   MD Ravindra Cuba, MD Mitzy Drew, MD Elieser Velasquez, MD Minda Doshi, PAScottC    Edin Cannon, MD Pediatrics   Judie Faith, MD Lani Odom, MD Pau Ny, APRN CNP Marisela Keys APRN CNP   MD Regina Naik, MD Sheba Ortega, CNP     Cristino Tobar, CNP Same-Day Provider (No follow-up visits)   TEO Montano, DNP Anali Ya, TEO Worley, FNP, BC MATTHEW NicoleC     Clinic hours: Monday - Thursday 7 am-6 pm; Fridays 7 am-5 pm.   Urgent care: Monday - Friday 10 am- 8 pm; Saturday and Sunday 9 am-5 pm.    Clinic: [phone]       [facility]: Monday - Thursday 8 am - 7 pm; Friday 8 am - 6 pm  [facility]: [phone]

## 2025-03-19 NOTE — LETTER
3/19/2025    Max Patiño   1980        To Whom it May Concern;    Max Patiño should be excused from work for an illness from  to Mar 19, 2025.  He can return to work on .      Sincerely,        Anali Ya PA-C

## 2025-03-25 NOTE — TELEPHONE ENCOUNTER
Message left with patient in follow up to see how the increase in flecainide is working.  Waiting call back from patient.  SABINE Tse

## 2025-04-02 NOTE — TELEPHONE ENCOUNTER
"Pt called back to discuss update on how he's doing since flecainide increase. States he feels no different since he started the higher dose and still dealing with the afib intermittently. Offered option to move forward with pfa as recommended by . pt not ready to move forward with this at this time, going to think about it. Hesitant as previous ablations have \"not worked,\" explained difference of pfa. Instructed pt to reach out if he changes his mind, stated verbal understanding. Pt also asking about a referral to rheumatology for back pain, informed him this is something his pcp should help with. Pt states he doesn't have one, encouraged him to establish this care. Instructed pt to follow up once he has made a decision on pfa and if needing anything else. Pt verbalized understanding. SABINE Bryant   "

## 2025-04-24 ENCOUNTER — HOSPITAL ENCOUNTER (EMERGENCY)
Facility: CLINIC | Age: 45
Discharge: HOME | End: 2025-04-24
Attending: EMERGENCY MEDICINE
Payer: COMMERCIAL

## 2025-04-24 VITALS
BODY MASS INDEX: 42.42 KG/M2 | SYSTOLIC BLOOD PRESSURE: 123 MMHG | WEIGHT: 303 LBS | HEIGHT: 71 IN | OXYGEN SATURATION: 95 % | TEMPERATURE: 97.6 F | RESPIRATION RATE: 18 BRPM | HEART RATE: 61 BPM | DIASTOLIC BLOOD PRESSURE: 77 MMHG

## 2025-04-24 DIAGNOSIS — I48.0 PAROXYSMAL ATRIAL FIBRILLATION (H): ICD-10-CM

## 2025-04-24 LAB
ATRIAL RATE - MUSE: 61 BPM
DIASTOLIC BLOOD PRESSURE - MUSE: NORMAL MMHG
INTERPRETATION ECG - MUSE: NORMAL
P AXIS - MUSE: 64 DEGREES
PR INTERVAL - MUSE: 190 MS
QRS DURATION - MUSE: 130 MS
QT - MUSE: 422 MS
QTC - MUSE: 424 MS
R AXIS - MUSE: -74 DEGREES
SYSTOLIC BLOOD PRESSURE - MUSE: NORMAL MMHG
T AXIS - MUSE: 103 DEGREES
VENTRICULAR RATE- MUSE: 61 BPM

## 2025-04-24 PROCEDURE — 99283 EMERGENCY DEPT VISIT LOW MDM: CPT

## 2025-04-24 PROCEDURE — 93005 ELECTROCARDIOGRAM TRACING: CPT

## 2025-04-24 ASSESSMENT — COLUMBIA-SUICIDE SEVERITY RATING SCALE - C-SSRS
1. IN THE PAST MONTH, HAVE YOU WISHED YOU WERE DEAD OR WISHED YOU COULD GO TO SLEEP AND NOT WAKE UP?: NO
2. HAVE YOU ACTUALLY HAD ANY THOUGHTS OF KILLING YOURSELF IN THE PAST MONTH?: NO
6. HAVE YOU EVER DONE ANYTHING, STARTED TO DO ANYTHING, OR PREPARED TO DO ANYTHING TO END YOUR LIFE?: NO

## 2025-04-24 ASSESSMENT — ACTIVITIES OF DAILY LIVING (ADL): ADLS_ACUITY_SCORE: 41

## 2025-04-24 NOTE — Clinical Note
Max Haroon was seen and treated in our emergency department on 4/24/2025.  He may return to work on 04/25/2025.       If you have any questions or concerns, please don't hesitate to call.      Max Angulo MD

## 2025-04-24 NOTE — DISCHARGE INSTRUCTIONS
I would touch base with Dr. Mata's office while you are awaiting your ablation.  Certainly if you have persistence of your symptoms, become more breathlessness, passout, develop worsening chest pain, chest pressure you should return here to the emergency department.  As we reviewed, I would stay well-hydrated, limit your Limit alcohol walking slowly.

## 2025-04-24 NOTE — ED TRIAGE NOTES
Pt comes in for afib workup d/t irregular beats ongoing for 3 days. Associated with SOB. On eliquis. Called cards this morning and said he had ablation scheduled for the 28th of this month.      Triage Assessment (Adult)       Row Name 04/24/25 4769          Triage Assessment    Airway WDL WDL        Respiratory WDL    Respiratory WDL rhythm/pattern     Rhythm/Pattern, Respiratory shortness of breath        Skin Circulation/Temperature WDL    Skin Circulation/Temperature WDL WDL        Cardiac WDL    Cardiac WDL rhythm     Cardiac Rhythm Atrial fibrillation        Peripheral/Neurovascular WDL    Peripheral Neurovascular WDL WDL        Cognitive/Neuro/Behavioral WDL    Cognitive/Neuro/Behavioral WDL WDL

## 2025-05-01 ENCOUNTER — PATIENT OUTREACH (OUTPATIENT)
Dept: CARE COORDINATION | Facility: CLINIC | Age: 45
End: 2025-05-01
Payer: COMMERCIAL

## 2025-05-12 DIAGNOSIS — R05.1 ACUTE COUGH: ICD-10-CM

## 2025-05-12 DIAGNOSIS — R11.10 VOMITING AND DIARRHEA: ICD-10-CM

## 2025-05-12 DIAGNOSIS — R06.2 WHEEZING: ICD-10-CM

## 2025-05-12 DIAGNOSIS — R52 BODY ACHES: ICD-10-CM

## 2025-05-12 DIAGNOSIS — R19.7 VOMITING AND DIARRHEA: ICD-10-CM

## 2025-05-12 DIAGNOSIS — I48.91 ATRIAL FIBRILLATION, UNSPECIFIED TYPE (H): ICD-10-CM

## 2025-05-12 DIAGNOSIS — Z79.01 LONG TERM CURRENT USE OF ANTICOAGULANT THERAPY: ICD-10-CM

## 2025-05-13 RX ORDER — LEVALBUTEROL TARTRATE 45 UG/1
1-2 AEROSOL, METERED ORAL EVERY 4 HOURS PRN
Qty: 15 G | Refills: 0 | OUTPATIENT
Start: 2025-05-13

## 2025-05-27 ENCOUNTER — TELEPHONE (OUTPATIENT)
Dept: CARDIOLOGY | Facility: CLINIC | Age: 45
End: 2025-05-27
Payer: COMMERCIAL

## 2025-05-27 ENCOUNTER — ANESTHESIA EVENT (OUTPATIENT)
Dept: CARDIOLOGY | Facility: CLINIC | Age: 45
End: 2025-05-27
Payer: COMMERCIAL

## 2025-05-27 DIAGNOSIS — I48.0 PAROXYSMAL ATRIAL FIBRILLATION (H): Primary | ICD-10-CM

## 2025-05-27 RX ORDER — LIDOCAINE 40 MG/G
CREAM TOPICAL
Status: CANCELLED | OUTPATIENT
Start: 2025-05-27

## 2025-05-27 RX ORDER — SODIUM CHLORIDE, SODIUM LACTATE, POTASSIUM CHLORIDE, CALCIUM CHLORIDE 600; 310; 30; 20 MG/100ML; MG/100ML; MG/100ML; MG/100ML
INJECTION, SOLUTION INTRAVENOUS CONTINUOUS
Status: CANCELLED | OUTPATIENT
Start: 2025-05-27

## 2025-05-27 NOTE — TELEPHONE ENCOUNTER
Called pt regarding afib ablation on 5/28    Notified of arrival time (11) and location   No solids 8 hours prior to arrival time  Clear liquids up until 2 hours prior to arrival- Discussed clear liquids allowed (7 up, ginger ale, chicken broth, apple juice, water, coffee with no cream or sugar)   Pt may have sips of water for am meds  Discussed meds to be held     Oral diabetes meds: none  Insulin: none    SGLT2 Inhibitors: none  - Invokana (canagliflozin), Farxiga (dapagliflozin), Jardiance (empagliflozin), Steglatro (ertugliflozin)  - hold 3-4 days prior to procedure    GLP-1 Agonists: none  - Byetta (exenitide), Victoza (liraglutide), Ozempic, Wegovy, Rybelsus (semaglutide), Trulicity (dulaglutide), Mounjaro (tirzepatide), Bydureon (Exenatide ER), Adlyxin (Lixisendatide)   - (Weekly dosing, hold GLP-1 agonists 7 days before procedure)  - (Daily or BID dosing, hold GLP-1 agonists day before and day of procedure)  - (Oral semaglutide, hold 7 days before procedure due to long half-life)    Diuretic: lisinopril/hydrochlorothiazide, spironolactone      Discussed that patient will need a  for ride home and someone to stay with pt x 24 hours once pt arrives home   Pt will check temperature am of procedure and call [facility] at [phone] in the am if temp > 100.0  Pt voiced understanding and stated they have no further questions at this time.    SABINE Bryant

## 2025-05-28 ENCOUNTER — ANESTHESIA (OUTPATIENT)
Dept: CARDIOLOGY | Facility: CLINIC | Age: 45
End: 2025-05-28
Payer: COMMERCIAL

## 2025-05-28 ENCOUNTER — HOSPITAL ENCOUNTER (OUTPATIENT)
Facility: CLINIC | Age: 45
Discharge: HOME | End: 2025-05-28
Attending: INTERNAL MEDICINE | Admitting: INTERNAL MEDICINE
Payer: COMMERCIAL

## 2025-05-28 VITALS
HEIGHT: 71 IN | OXYGEN SATURATION: 98 % | SYSTOLIC BLOOD PRESSURE: 133 MMHG | WEIGHT: 299 LBS | TEMPERATURE: 97.7 F | RESPIRATION RATE: 18 BRPM | BODY MASS INDEX: 41.86 KG/M2 | HEART RATE: 70 BPM | DIASTOLIC BLOOD PRESSURE: 84 MMHG

## 2025-05-28 DIAGNOSIS — I48.0 PAROXYSMAL ATRIAL FIBRILLATION (H): ICD-10-CM

## 2025-05-28 LAB
ACT BLD: 251 SECONDS (ref 74–150)
ANION GAP SERPL CALCULATED.3IONS-SCNC: 7 MMOL/L (ref 7–15)
ATRIAL RATE - MUSE: 57 BPM
BUN SERPL-MCNC: 26 MG/DL (ref 6–20)
CALCIUM SERPL-MCNC: 8.9 MG/DL (ref 8.8–10.4)
CHLORIDE SERPL-SCNC: 102 MMOL/L (ref 98–107)
CREAT SERPL-MCNC: 1.37 MG/DL (ref 0.67–1.17)
DIASTOLIC BLOOD PRESSURE - MUSE: NORMAL MMHG
EGFRCR SERPLBLD CKD-EPI 2021: 64 ML/MIN/1.73M2
ERYTHROCYTE [DISTWIDTH] IN BLOOD BY AUTOMATED COUNT: 13 % (ref 10–15)
GLUCOSE BLDC GLUCOMTR-MCNC: 114 MG/DL (ref 70–99)
GLUCOSE SERPL-MCNC: 102 MG/DL (ref 70–99)
HCO3 SERPL-SCNC: 29 MMOL/L (ref 22–29)
HCT VFR BLD AUTO: 38.5 % (ref 40–53)
HGB BLD-MCNC: 12.8 G/DL (ref 13.3–17.7)
INTERPRETATION ECG - MUSE: NORMAL
MCH RBC QN AUTO: 30.5 PG (ref 26.5–33)
MCHC RBC AUTO-ENTMCNC: 33.2 G/DL (ref 31.5–36.5)
MCV RBC AUTO: 92 FL (ref 78–100)
P AXIS - MUSE: 70 DEGREES
PLATELET # BLD AUTO: 233 10E3/UL (ref 150–450)
POTASSIUM SERPL-SCNC: 4.7 MMOL/L (ref 3.4–5.3)
PR INTERVAL - MUSE: 198 MS
QRS DURATION - MUSE: 136 MS
QT - MUSE: 460 MS
QTC - MUSE: 447 MS
R AXIS - MUSE: -77 DEGREES
RBC # BLD AUTO: 4.2 10E6/UL (ref 4.4–5.9)
SODIUM SERPL-SCNC: 138 MMOL/L (ref 135–145)
SYSTOLIC BLOOD PRESSURE - MUSE: NORMAL MMHG
T AXIS - MUSE: 53 DEGREES
VENTRICULAR RATE- MUSE: 57 BPM
WBC # BLD AUTO: 9.7 10E3/UL (ref 4–11)

## 2025-05-28 PROCEDURE — 85347 COAGULATION TIME ACTIVATED: CPT

## 2025-05-28 PROCEDURE — 93656 COMPRE EP EVAL ABLTJ ATR FIB: CPT | Performed by: INTERNAL MEDICINE

## 2025-05-28 PROCEDURE — 250N000011 HC RX IP 250 OP 636: Performed by: INTERNAL MEDICINE

## 2025-05-28 PROCEDURE — 999N000054 HC STATISTIC EKG NON-CHARGEABLE

## 2025-05-28 PROCEDURE — 93010 ELECTROCARDIOGRAM REPORT: CPT | Performed by: INTERNAL MEDICINE

## 2025-05-28 PROCEDURE — C1766 INTRO/SHEATH,STRBLE,NON-PEEL: HCPCS | Performed by: INTERNAL MEDICINE

## 2025-05-28 PROCEDURE — 250N000025 HC SEVOFLURANE, PER MIN: Performed by: INTERNAL MEDICINE

## 2025-05-28 PROCEDURE — 272N000001 HC OR GENERAL SUPPLY STERILE: Performed by: INTERNAL MEDICINE

## 2025-05-28 PROCEDURE — C1769 GUIDE WIRE: HCPCS | Performed by: INTERNAL MEDICINE

## 2025-05-28 PROCEDURE — 84295 ASSAY OF SERUM SODIUM: CPT | Performed by: INTERNAL MEDICINE

## 2025-05-28 PROCEDURE — 80048 BASIC METABOLIC PNL TOTAL CA: CPT | Performed by: INTERNAL MEDICINE

## 2025-05-28 PROCEDURE — 250N000013 HC RX MED GY IP 250 OP 250 PS 637

## 2025-05-28 PROCEDURE — 85018 HEMOGLOBIN: CPT | Performed by: INTERNAL MEDICINE

## 2025-05-28 PROCEDURE — 258N000003 HC RX IP 258 OP 636: Performed by: INTERNAL MEDICINE

## 2025-05-28 PROCEDURE — 999N000184 HC STATISTIC TELEMETRY

## 2025-05-28 PROCEDURE — 250N000009 HC RX 250: Mod: JW | Performed by: INTERNAL MEDICINE

## 2025-05-28 PROCEDURE — 93005 ELECTROCARDIOGRAM TRACING: CPT

## 2025-05-28 PROCEDURE — 93657 TX L/R ATRIAL FIB ADDL: CPT | Performed by: INTERNAL MEDICINE

## 2025-05-28 PROCEDURE — C1759 CATH, INTRA ECHOCARDIOGRAPHY: HCPCS | Performed by: INTERNAL MEDICINE

## 2025-05-28 PROCEDURE — 250N000011 HC RX IP 250 OP 636

## 2025-05-28 PROCEDURE — C1894 INTRO/SHEATH, NON-LASER: HCPCS | Performed by: INTERNAL MEDICINE

## 2025-05-28 PROCEDURE — C1733 CATH, EP, OTHR THAN COOL-TIP: HCPCS | Performed by: INTERNAL MEDICINE

## 2025-05-28 PROCEDURE — 370N000017 HC ANESTHESIA TECHNICAL FEE, PER MIN: Performed by: INTERNAL MEDICINE

## 2025-05-28 PROCEDURE — 36415 COLL VENOUS BLD VENIPUNCTURE: CPT | Performed by: INTERNAL MEDICINE

## 2025-05-28 PROCEDURE — 250N000009 HC RX 250

## 2025-05-28 PROCEDURE — C1760 CLOSURE DEV, VASC: HCPCS | Performed by: INTERNAL MEDICINE

## 2025-05-28 PROCEDURE — 82962 GLUCOSE BLOOD TEST: CPT

## 2025-05-28 PROCEDURE — 999N000071 HC STATISTIC HEART CATH LAB OR EP LAB

## 2025-05-28 PROCEDURE — C1732 CATH, EP, DIAG/ABL, 3D/VECT: HCPCS | Performed by: INTERNAL MEDICINE

## 2025-05-28 PROCEDURE — 250N000013 HC RX MED GY IP 250 OP 250 PS 637: Performed by: INTERNAL MEDICINE

## 2025-05-28 PROCEDURE — 93655 ICAR CATH ABLTJ DSCRT ARRHYT: CPT | Performed by: INTERNAL MEDICINE

## 2025-05-28 RX ORDER — SODIUM CHLORIDE, SODIUM LACTATE, POTASSIUM CHLORIDE, CALCIUM CHLORIDE 600; 310; 30; 20 MG/100ML; MG/100ML; MG/100ML; MG/100ML
INJECTION, SOLUTION INTRAVENOUS CONTINUOUS
Status: DISCONTINUED | OUTPATIENT
Start: 2025-05-28 | End: 2025-05-28 | Stop reason: HOSPADM

## 2025-05-28 RX ORDER — NALOXONE HYDROCHLORIDE 0.4 MG/ML
0.4 INJECTION, SOLUTION INTRAMUSCULAR; INTRAVENOUS; SUBCUTANEOUS
Status: DISCONTINUED | OUTPATIENT
Start: 2025-05-28 | End: 2025-05-28

## 2025-05-28 RX ORDER — GLYCOPYRROLATE 0.2 MG/ML
INJECTION, SOLUTION INTRAMUSCULAR; INTRAVENOUS PRN
Status: DISCONTINUED | OUTPATIENT
Start: 2025-05-28 | End: 2025-05-28

## 2025-05-28 RX ORDER — NALOXONE HYDROCHLORIDE 0.4 MG/ML
0.2 INJECTION, SOLUTION INTRAMUSCULAR; INTRAVENOUS; SUBCUTANEOUS
Status: DISCONTINUED | OUTPATIENT
Start: 2025-05-28 | End: 2025-05-28

## 2025-05-28 RX ORDER — LIDOCAINE 40 MG/G
CREAM TOPICAL
Status: DISCONTINUED | OUTPATIENT
Start: 2025-05-28 | End: 2025-05-28 | Stop reason: HOSPADM

## 2025-05-28 RX ORDER — KETAMINE HYDROCHLORIDE 10 MG/ML
INJECTION INTRAMUSCULAR; INTRAVENOUS PRN
Status: DISCONTINUED | OUTPATIENT
Start: 2025-05-28 | End: 2025-05-28

## 2025-05-28 RX ORDER — ONDANSETRON 4 MG/1
4 TABLET, ORALLY DISINTEGRATING ORAL EVERY 30 MIN PRN
Status: DISCONTINUED | OUTPATIENT
Start: 2025-05-28 | End: 2025-05-28 | Stop reason: HOSPADM

## 2025-05-28 RX ORDER — FENTANYL CITRATE 50 UG/ML
50 INJECTION, SOLUTION INTRAMUSCULAR; INTRAVENOUS EVERY 5 MIN PRN
Status: DISCONTINUED | OUTPATIENT
Start: 2025-05-28 | End: 2025-05-28 | Stop reason: HOSPADM

## 2025-05-28 RX ORDER — FENTANYL CITRATE 50 UG/ML
INJECTION, SOLUTION INTRAMUSCULAR; INTRAVENOUS PRN
Status: DISCONTINUED | OUTPATIENT
Start: 2025-05-28 | End: 2025-05-28

## 2025-05-28 RX ORDER — NALOXONE HYDROCHLORIDE 0.4 MG/ML
0.2 INJECTION, SOLUTION INTRAMUSCULAR; INTRAVENOUS; SUBCUTANEOUS
Status: DISCONTINUED | OUTPATIENT
Start: 2025-05-28 | End: 2025-05-28 | Stop reason: HOSPADM

## 2025-05-28 RX ORDER — PHENYLEPHRINE HCL IN 0.9% NACL 50MG/250ML
PLASTIC BAG, INJECTION (ML) INTRAVENOUS CONTINUOUS PRN
Status: DISCONTINUED | OUTPATIENT
Start: 2025-05-28 | End: 2025-05-28

## 2025-05-28 RX ORDER — FENTANYL CITRATE 50 UG/ML
25 INJECTION, SOLUTION INTRAMUSCULAR; INTRAVENOUS EVERY 5 MIN PRN
Status: DISCONTINUED | OUTPATIENT
Start: 2025-05-28 | End: 2025-05-28 | Stop reason: HOSPADM

## 2025-05-28 RX ORDER — PROPOFOL 10 MG/ML
INJECTION, EMULSION INTRAVENOUS PRN
Status: DISCONTINUED | OUTPATIENT
Start: 2025-05-28 | End: 2025-05-28

## 2025-05-28 RX ORDER — ONDANSETRON 2 MG/ML
4 INJECTION INTRAMUSCULAR; INTRAVENOUS EVERY 30 MIN PRN
Status: DISCONTINUED | OUTPATIENT
Start: 2025-05-28 | End: 2025-05-28 | Stop reason: HOSPADM

## 2025-05-28 RX ORDER — OXYCODONE AND ACETAMINOPHEN 5; 325 MG/1; MG/1
1 TABLET ORAL EVERY 4 HOURS PRN
Status: DISCONTINUED | OUTPATIENT
Start: 2025-05-28 | End: 2025-05-28

## 2025-05-28 RX ORDER — FENTANYL CITRATE-0.9 % NACL/PF 10 MCG/ML
PLASTIC BAG, INJECTION (ML) INTRAVENOUS PRN
Status: DISCONTINUED | OUTPATIENT
Start: 2025-05-28 | End: 2025-05-28

## 2025-05-28 RX ORDER — DEXAMETHASONE SODIUM PHOSPHATE 4 MG/ML
4 INJECTION, SOLUTION INTRA-ARTICULAR; INTRALESIONAL; INTRAMUSCULAR; INTRAVENOUS; SOFT TISSUE
Status: DISCONTINUED | OUTPATIENT
Start: 2025-05-28 | End: 2025-05-28 | Stop reason: HOSPADM

## 2025-05-28 RX ORDER — HYDROMORPHONE HYDROCHLORIDE 1 MG/ML
0.4 INJECTION, SOLUTION INTRAMUSCULAR; INTRAVENOUS; SUBCUTANEOUS EVERY 5 MIN PRN
Status: DISCONTINUED | OUTPATIENT
Start: 2025-05-28 | End: 2025-05-28 | Stop reason: HOSPADM

## 2025-05-28 RX ORDER — LIDOCAINE HYDROCHLORIDE 20 MG/ML
INJECTION, SOLUTION INFILTRATION; PERINEURAL PRN
Status: DISCONTINUED | OUTPATIENT
Start: 2025-05-28 | End: 2025-05-28

## 2025-05-28 RX ORDER — NALOXONE HYDROCHLORIDE 0.4 MG/ML
0.1 INJECTION, SOLUTION INTRAMUSCULAR; INTRAVENOUS; SUBCUTANEOUS
Status: DISCONTINUED | OUTPATIENT
Start: 2025-05-28 | End: 2025-05-28 | Stop reason: HOSPADM

## 2025-05-28 RX ORDER — HYDROMORPHONE HYDROCHLORIDE 1 MG/ML
0.2 INJECTION, SOLUTION INTRAMUSCULAR; INTRAVENOUS; SUBCUTANEOUS EVERY 5 MIN PRN
Status: DISCONTINUED | OUTPATIENT
Start: 2025-05-28 | End: 2025-05-28 | Stop reason: HOSPADM

## 2025-05-28 RX ORDER — ONDANSETRON 2 MG/ML
INJECTION INTRAMUSCULAR; INTRAVENOUS PRN
Status: DISCONTINUED | OUTPATIENT
Start: 2025-05-28 | End: 2025-05-28

## 2025-05-28 RX ORDER — OXYCODONE AND ACETAMINOPHEN 5; 325 MG/1; MG/1
1 TABLET ORAL EVERY 4 HOURS PRN
Status: DISCONTINUED | OUTPATIENT
Start: 2025-05-28 | End: 2025-05-28 | Stop reason: HOSPADM

## 2025-05-28 RX ORDER — DEXAMETHASONE SODIUM PHOSPHATE 4 MG/ML
INJECTION, SOLUTION INTRA-ARTICULAR; INTRALESIONAL; INTRAMUSCULAR; INTRAVENOUS; SOFT TISSUE PRN
Status: DISCONTINUED | OUTPATIENT
Start: 2025-05-28 | End: 2025-05-28

## 2025-05-28 RX ORDER — NALOXONE HYDROCHLORIDE 0.4 MG/ML
0.4 INJECTION, SOLUTION INTRAMUSCULAR; INTRAVENOUS; SUBCUTANEOUS
Status: DISCONTINUED | OUTPATIENT
Start: 2025-05-28 | End: 2025-05-28 | Stop reason: HOSPADM

## 2025-05-28 RX ORDER — ALBUTEROL SULFATE 90 UG/1
INHALANT RESPIRATORY (INHALATION) PRN
Status: DISCONTINUED | OUTPATIENT
Start: 2025-05-28 | End: 2025-05-28

## 2025-05-28 RX ORDER — PROTAMINE SULFATE 10 MG/ML
INJECTION, SOLUTION INTRAVENOUS
Status: DISCONTINUED | OUTPATIENT
Start: 2025-05-28 | End: 2025-05-28 | Stop reason: HOSPADM

## 2025-05-28 RX ORDER — HEPARIN SODIUM 1000 [USP'U]/ML
INJECTION, SOLUTION INTRAVENOUS; SUBCUTANEOUS
Status: DISCONTINUED | OUTPATIENT
Start: 2025-05-28 | End: 2025-05-28 | Stop reason: HOSPADM

## 2025-05-28 RX ADMIN — PROPOFOL 300 MG: 10 INJECTION, EMULSION INTRAVENOUS at 14:13

## 2025-05-28 RX ADMIN — SUCCINYLCHOLINE CHLORIDE 100 MG: 20 INJECTION, SOLUTION INTRAMUSCULAR; INTRAVENOUS; PARENTERAL at 15:25

## 2025-05-28 RX ADMIN — FENTANYL CITRATE 50 MCG: 50 INJECTION INTRAMUSCULAR; INTRAVENOUS at 14:11

## 2025-05-28 RX ADMIN — Medication 50 MG: at 14:13

## 2025-05-28 RX ADMIN — SUGAMMADEX 300 MG: 100 INJECTION, SOLUTION INTRAVENOUS at 15:19

## 2025-05-28 RX ADMIN — MIDAZOLAM 2 MG: 1 INJECTION INTRAMUSCULAR; INTRAVENOUS at 14:06

## 2025-05-28 RX ADMIN — FENTANYL CITRATE 50 MCG: 50 INJECTION INTRAMUSCULAR; INTRAVENOUS at 14:08

## 2025-05-28 RX ADMIN — Medication 200 MCG: at 14:45

## 2025-05-28 RX ADMIN — ROCURONIUM BROMIDE 20 MG: 50 INJECTION, SOLUTION INTRAVENOUS at 14:39

## 2025-05-28 RX ADMIN — ALBUTEROL SULFATE 10 PUFF: 108 INHALANT RESPIRATORY (INHALATION) at 15:26

## 2025-05-28 RX ADMIN — OXYCODONE HYDROCHLORIDE AND ACETAMINOPHEN 1 TABLET: 5; 325 TABLET ORAL at 17:25

## 2025-05-28 RX ADMIN — Medication 0.6 MCG/KG/MIN: at 14:14

## 2025-05-28 RX ADMIN — ONDANSETRON 4 MG: 2 INJECTION INTRAMUSCULAR; INTRAVENOUS at 15:16

## 2025-05-28 RX ADMIN — DEXAMETHASONE SODIUM PHOSPHATE 4 MG: 4 INJECTION, SOLUTION INTRA-ARTICULAR; INTRALESIONAL; INTRAMUSCULAR; INTRAVENOUS; SOFT TISSUE at 14:14

## 2025-05-28 RX ADMIN — GLYCOPYRROLATE 0.2 MG: 0.2 INJECTION, SOLUTION INTRAMUSCULAR; INTRAVENOUS at 14:48

## 2025-05-28 RX ADMIN — LIDOCAINE HYDROCHLORIDE 100 MG: 20 INJECTION, SOLUTION INFILTRATION; PERINEURAL at 14:13

## 2025-05-28 RX ADMIN — Medication 200 MCG: at 14:27

## 2025-05-28 RX ADMIN — ROCURONIUM BROMIDE 50 MG: 50 INJECTION, SOLUTION INTRAVENOUS at 14:14

## 2025-05-28 RX ADMIN — SODIUM CHLORIDE, SODIUM LACTATE, POTASSIUM CHLORIDE, AND CALCIUM CHLORIDE: .6; .31; .03; .02 INJECTION, SOLUTION INTRAVENOUS at 11:45

## 2025-05-28 ASSESSMENT — ACTIVITIES OF DAILY LIVING (ADL)
ADLS_ACUITY_SCORE: 41

## 2025-05-28 ASSESSMENT — ENCOUNTER SYMPTOMS: DYSRHYTHMIAS: 1

## 2025-05-28 ASSESSMENT — LIFESTYLE VARIABLES: TOBACCO_USE: 1

## 2025-05-28 NOTE — PROGRESS NOTES
[facility] Admission Nursing Note    Reason for admission: A Fib Ablation  CS arrival time: 1125    Accompanied by: self  Name/phone of DC : Valentina - friend ... [phone]    Medications held: see PTA med list    Education/questions answered: Procedure explained. All questions & concerns addressed.    Plan: Recover in care suites - discharge after patient meets criteria and diagnostic testing complete. Home with friend. Verified with Valentina that she will be staying with pt overnight & for 24 hrs via phone.    A/O. IV flds infusing. Procedure explained. All questions & concerns addressed.   Pt resting in bed, denies additional needs at this time, call light within reach. No family/friends at bedside. Pt reports level 6/10 aching discomfort to right hip. States that he is having hip surgery in a month.     *** Dr Mata at bedside to speak with pt. Consent signed at this time.    *** Report given to Phyllis Benitez RN.

## 2025-05-28 NOTE — ANESTHESIA CARE TRANSFER NOTE
Patient: Max Patiño    Procedure: Procedure(s):  Ablation Atrial Fibrilation       Diagnosis: PAF  Diagnosis Additional Information: No value filed.    Anesthesia Type:   General     Note:    Oropharynx: spontaneously breathing and oropharynx clear of all foreign objects  Level of Consciousness: awake  Oxygen Supplementation: face mask  Level of Supplemental Oxygen (L/min / FiO2): 6  Independent Airway: airway patency satisfactory and stable  Dentition: dentition unchanged  Vital Signs Stable: post-procedure vital signs reviewed and stable  Report to RN Given: handoff report given  Patient transferred to: PACU    Handoff Report: Identifed the Patient, Identified the Reponsible Provider, Reviewed the pertinent medical history, Discussed the surgical course, Reviewed Intra-OP anesthesia mangement and issues during anesthesia, Set expectations for post-procedure period and Allowed opportunity for questions and acknowledgement of understanding      Vitals:  Vitals Value    /60    Temp 36.3    Pulse 66    Resp 11    SpO2 93 %      Electronically Signed By: TEO Romero CRNA  May 28, 2025  3:55 PM

## 2025-05-28 NOTE — ANESTHESIA PROCEDURE NOTES
Airway       Patient location during procedure: OR       Procedure Start/Stop Times: 5/28/2025 2:17 PM  Staff -        Anesthesiologist:  Timo Wilson MD       CRNA: Anirudh Turner APRN CRNA       Performed By: CRNA  Consent for Airway        Urgency: elective  Indications and Patient Condition       Indications for airway management: hugo-procedural       Induction type:intravenous       Mask difficulty assessment: 3 - difficult mask (inadequate, unstable, or two providers) +/- NMBA    Final Airway Details       Final airway type: endotracheal airway       Successful airway: ETT - single and Oral  Endotracheal Airway Details        ETT size (mm): 8.0       Cuffed: yes       Cuff volume (mL): 7       Successful intubation technique: video laryngoscopy       VL Blade Size: Lechuga 4       Grade View of Cords: 1       Adjucts: stylet       Position: Right       Measured from: lips       Secured at (cm): 23       Bite block used: None    Post intubation assessment        Placement verified by: capnometry, equal breath sounds and chest rise        Number of attempts at approach: 1       Number of other approaches attempted: 0       Secured with: tape       Ease of procedure: easy       Dentition: Intact and Unchanged    Medication(s) Administered   Medication Administration Time: 5/28/2025 2:17 PM    Additional Comments       Prior to induction, patient positions self to comfort on pillow. Head and neck remained neutral, midline, and unchanged in position throughout induction, mask ventilation, and intubation.

## 2025-05-28 NOTE — ANESTHESIA POSTPROCEDURE EVALUATION
Patient: Max Patiño    Procedure: Procedure(s):  Ablation Atrial Fibrilation       Anesthesia Type:  General    Note:  Disposition: Inpatient   Postop Pain Control: Uneventful            Sign Out: Well controlled pain   PONV:    Neuro/Psych: Uneventful            Sign Out: Acceptable/Baseline neuro status   Airway/Respiratory: Uneventful            Sign Out: Acceptable/Baseline resp. status   CV/Hemodynamics: Uneventful            Sign Out: Acceptable CV status   Other NRE: NONE   DID A NON-ROUTINE EVENT OCCUR? No           Last vitals:  Vitals Value Taken Time   /78 05/28/25 16:45   Temp 36.5  C (97.7  F) 05/28/25 16:30   Pulse 56 05/28/25 16:47   Resp 8 05/28/25 16:47   SpO2 94 % 05/28/25 16:48   Vitals shown include unfiled device data.    Electronically Signed By: Timo Wilson MD  May 28, 2025  5:02 PM

## 2025-05-28 NOTE — ANESTHESIA PREPROCEDURE EVALUATION
Anesthesia Pre-Procedure Evaluation    Patient: Max Patiño   MRN: [id number] : 1980          Procedure : Procedure(s):  Ablation Atrial Fibrilation         Past Medical History:   Diagnosis Date    Anxiety     Diabetes mellitus (H)     Difficult intubation     [place] [facility] [place] attempt to do an a.fib ablation    Former smoker     Hypertension 2011    Morbid obesity (H) 2011    Paroxysmal atrial fibrillation (H)     ablations x3    Sleep apnea 2011    Uncomplicated asthma       Past Surgical History:   Procedure Laterality Date    EP ABLATION FOCAL AFIB   [place] [facility] [place] difficult intubtion - case aborted    EP ABLATION FOCAL AFIB N/A 2024    Procedure: Ablation Atrial Fibrilation;  Surgeon: Kaushal Mata MD;  Location:  [facility]    EP LOOP RECORDER      ORIF right elbow        No Known Allergies   Social History     Tobacco Use    Smoking status: Former     Current packs/day: 0.00     Average packs/day: 2.0 packs/day for 18.0 years (36.0 ttl pk-yrs)     Types: Cigarettes     Start date: 1993     Quit date: 2011     Years since quittin.7    Smokeless tobacco: Current    Tobacco comments:     Uses vape   Substance Use Topics    Alcohol use: Not Currently     Alcohol/week: 1.7 standard drinks of alcohol     Types: 2 drink(s) per week      Wt Readings from Last 1 Encounters:   25 135.6 kg (299 lb)        Anesthesia Evaluation   Pt has had prior anesthetic.     History of anesthetic complications  - difficult airway.      ROS/MED HX  ENT/Pulmonary:     (+) sleep apnea,               tobacco use, Current use,    Intermittent, asthma                  Neurologic:  - neg neurologic ROS     Cardiovascular:     (+)  hypertension- -   -  - -                        dysrhythmias, a-fib,        Previous cardiac testing   Echo: Date:  Results:  Interpretation Summary     Left ventricular systolic function is normal.  The visual ejection  "fraction is 60-65%.  No regional wall motion abnormalities.  The right ventricular systolic function is normal.  No valve disease.     There is no comparison study available    Stress Test:  Date: Results:    ECG Reviewed:  Date: Results:    Cath:  Date: Results:   (-) CAD   METS/Exercise Tolerance:     Hematologic:       Musculoskeletal:       GI/Hepatic:    (-) GERD   Renal/Genitourinary:       Endo:     (+)  type II DM,             Obesity,       Psychiatric/Substance Use:  - neg psychiatric ROS     Infectious Disease:       Malignancy:    (-) malignancy   Other:              Physical Exam  Airway  Mallampati: III  Mouth opening: >= 4 cm    Cardiovascular   Rhythm: irregular     Dental   (+) Minor Abnormalities - some fillings, tiny chips      Pulmonary - normal exam      Neurological   Other Findings       OUTSIDE LABS:  CBC:   Lab Results   Component Value Date    WBC 9.7 05/28/2025    WBC 10.7 07/08/2024    HGB 12.8 (L) 05/28/2025    HGB 13.0 (L) 07/08/2024    HCT 38.5 (L) 05/28/2025    HCT 39.7 (L) 07/08/2024     05/28/2025     07/08/2024     BMP:   Lab Results   Component Value Date     07/08/2024     12/28/2011    POTASSIUM 4.8 07/08/2024    POTASSIUM 4.1 12/28/2011    CHLORIDE 103 07/08/2024    CHLORIDE 104 12/28/2011    CO2 27 07/08/2024    CO2 30 12/28/2011    BUN 14.0 07/08/2024    BUN 17 12/28/2011    CR 0.97 07/08/2024    CR 0.98 12/28/2011    GLC 97 07/08/2024    GLC 88 12/28/2011     COAGS: No results found for: \"PTT\", \"INR\", \"FIBR\"  POC: No results found for: \"BGM\", \"HCG\", \"HCGS\"  HEPATIC:   Lab Results   Component Value Date    ALBUMIN 4.4 12/14/2011    PROTTOTAL 7.8 12/14/2011    ALT 31 12/14/2011    AST 31 12/14/2011    ALKPHOS 84 12/14/2011    BILITOTAL 0.5 12/14/2011     OTHER:   Lab Results   Component Value Date    JANNETTE 9.6 07/08/2024    TSH 2.28 12/14/2011    CRP 5.1 12/14/2011    SED 7 12/14/2011       Anesthesia Plan    ASA Status:  3      NPO Status: NPO " "Appropriate   Anesthesia Type: General.  Airway: oral.  Induction: inhalational.  Maintenance: Balanced.   Techniques and Equipment:     - Airway:  Planned airway equipment includes video laryngoscope.     - Monitoring Plan: standard ASA monitoring     Consents    Anesthesia Plan(s) and associated risks, benefits, and realistic alternatives discussed. Questions answered and patient/representative(s) expressed understanding.     - Discussed: CRNA     - Discussed with:  Patient        - Pt is DNR/DNI Status: no DNR     Blood Consent:      - Discussed with: not discussed.     Postoperative Care    Pain management: non-narcotic analgesics, plan for postoperative opioid use.     Comments:    Other Comments: In 7/2024, patient was easily intubated with VL at [facility]. Chart reviewed.                Timo Wilson MD    I have reviewed the pertinent notes and labs in the chart from the past 30 days and (re)examined the patient.  Any updates or changes from those notes are reflected in this note.    Clinically Significant Risk Factors Present on Admission                # Drug Induced Coagulation Defect: home medication list includes an anticoagulant medication    # Hypertension: Noted on problem list           # Morbid Obesity: Estimated body mass index is 41.7 kg/m  as calculated from the following:    Height as of this encounter: 1.803 m (5' 11\").    Weight as of this encounter: 135.6 kg (299 lb).                    "

## 2025-05-28 NOTE — PROGRESS NOTES
Re-isolation of reconnected PV potentials and isolation of posterior LA and SVC  Accesses closed with devices  Bedrest for 2 hours  Resume all pta meds today.

## 2025-05-28 NOTE — DISCHARGE INSTRUCTIONS
A Fib Ablation Discharge Instructions    After you go home:  Have an adult stay with you until tomorrow.  You may eat your normal diet, unless your doctor tells you otherwise.  RELAX and take it easy for 3 days.        For 24-48 hours (due to the sedation you received):  DO NOT DRIVE FOR 2 DAYS!   Do NOT make any important or legal decisions.  Do NOT drive or operate machines at home or at work.  Do NOT drink alcohol.    Care of Puncture Site:  Check the puncture site severy 1-2 hours while awake.  For 2-3 days, when you cough, sneeze, laugh or move your bowels, hold your hand over the puncture sites and press firmly.  Please remove Dressing after 24 hours, works best to take off in shower.  Then apply a band aid daily for at least 3 days (if needed). If there is minor oozing, apply another band aid and remove it after 12 hours.   It is normal to have a bruising or a small lump that is present under the skin . This will go away on its own after 3-4 weeks.   You may shower. Do NOT take a bath, or use a hot tub or pool until groin site heals, which may take up to a week.  Do NOT scrub the site. Do NOT use anything like, lotion, alcohol or powder near/on the puncture site.    Activity:  NO bending, stooping over or squatting  for 3 days  Do NOT lift, push or pull more than 10 pounds (equal to a gallon of milk) for 3 days.  NO repetitive motions such as loading , vacuuming, raking, shoveling,   Limit going up and down the stairs repetitively, for the first 24 hours after procedure.     Bleeding:  If you start bleeding from the groin site, lie down flat and press firmly on the site for 10 minutes or until bleeding stops.   Once bleeding stops, lay flat for 1-2 hours.  Call your A Fib nurse if bleeding does not stop or after hours will need to go to ER.       Go to ER or Call 911 right away if you have heavy bleeding or bleeding that does not stop.    Medications:  Take your medications, including blood  thinners, unless your doctor tells you not to.  If you have stopped any other medicines, check with your nurse or provider about when to restart them.  If you have PAIN or a TIGHTNESS in your chest, you MAY take Tylenol (acetaminophen) and if this does not help, you MAY take Advil (ibuprofen-400 mg with food).  If you have constipation or prone to constipation,  take a fiber supplement, ie metamucil or stool softners.    Call the A Fib RN if:  Chest pain not relieved by acetaminophen or ibuprofen  Difficulty swallowing and/or coughing up blood  Shortness of breath  Increased groin pain or a large or growing hard lump around the site.  Groin site is red, swollen, hot or tender.  Blood or fluid is draining from the groin site.  You have chills or a fever greater than 101 F (38 C).  Your leg feels numb, cool or changes color.  If groin pain is not relieved by Tylenol or Ibuprofen.  Recurrent irregular or fast heart rate lasting over 2-3 hours.  Any questions or concerns.    Heart rhythms:  You may have some irregular heartbeats. These feel very strong. They may make you feel that the A Fib is going to start again.  Give it time. The irregular beats should occur less often.    Follow Up Appointments:  6/4 @4 with Shani Balderrama PA-C  9/23 @3:15 with Dr.Pham FATIMA [facility]   A Fib clinic RN's Sylvia Graham Kathy, Madalyn  [phone] (Mon-Fri, 8:00-4:30)   [phone] Option 2 (7 days a week) after hours for on call Cardiologist.

## 2025-05-29 ENCOUNTER — TELEPHONE (OUTPATIENT)
Dept: CARDIOLOGY | Facility: CLINIC | Age: 45
End: 2025-05-29
Payer: COMMERCIAL

## 2025-05-29 NOTE — PROGRESS NOTES
[facility] Discharge Nursing Note    Patient Information  Name: Max Patiño  Age: 45 year old    Discharge Education:  Discharge instructions reviewed: Yes  Additional education/resources provided:   Patient/patient representative verbalizes understanding: Yes  Patient discharging on new medications: No  Medication education completed: Yes    Discharge Plans:   Discharge location: home  Discharge ride contacted: Yes  Approximate discharge time: 1855    Discharge Criteria:  Discharge criteria met and vital signs stable: Yes    Patient Belongs:  Patient belongings returned to patient: Yes    Chelsy Haney RN

## 2025-05-29 NOTE — TELEPHONE ENCOUNTER
Called pt in follow up to EP procedure performed 5/28    Reviewed discharge instructions with patient.    Pain level is minimal, states having some body aches but had this previously post anesthesia. Instructed to monitor and let us know if not improving    Groin dressing is dry intact, planning to take off in shower this evening    Breathing normal and unlabored    Pt is eating, drinking and urinating without difficulty. LBM was  prior to surgery, encouraged to take stool softener/laxative if continues     Reviewed activity instructions    Reviewed any medication changes     Discussed that possible Afib episodes may occur and to call if > 2 hours in duration or if symptomatic or sustained HR > 120    Discussed reasons to call Afib RN    Reminded pt of follow up appointments    Verified pt has Afib RN and after hours Cardiology phone numbers    Pt voiced understanding and has no further questions/concerns at this time.    SABINE Bryant

## 2025-05-30 LAB
ATRIAL RATE - MUSE: 57 BPM
DIASTOLIC BLOOD PRESSURE - MUSE: NORMAL MMHG
INTERPRETATION ECG - MUSE: NORMAL
P AXIS - MUSE: 70 DEGREES
PR INTERVAL - MUSE: 198 MS
QRS DURATION - MUSE: 136 MS
QT - MUSE: 460 MS
QTC - MUSE: 447 MS
R AXIS - MUSE: -77 DEGREES
SYSTOLIC BLOOD PRESSURE - MUSE: NORMAL MMHG
T AXIS - MUSE: 53 DEGREES
VENTRICULAR RATE- MUSE: 57 BPM

## 2025-05-31 NOTE — PROGRESS NOTES
"[facility]    I had the pleasure of seeing Max when he came for follow up of AF.  This 45 year old sees Dr. Mata for his history of:    Symptomatic paroxysmal AFib - noted on ILR placed for palpitations (2020).  Attempted ablation 11/2023 at [facility] [place] aborted due to oropharynx injury.  S/p EPS/AFib ablation (PVI, empiric CTI 7/8/2024).  Updated ZioPatch 11/2024 showed 11% AF burden.  Started on flecainide.  Repeat ablation (PVI, SVC, LA wall with PFA) 5/28/2025  HTN  ASLVADOR -  Working on CPAP   Prior heavy EtOH use - quit ~2021      Last Visit & Interval History:  Dr. Mata saw him 10/2024 at which time he was noting increasing palpitations.  Updated ZioPatch showed 11% AF burden of AFib .  Started on flecainide 11/2024 but contacted us again 2/2025 due to continued episodes of AF despite flecainide.  Dose was increased but given continued symptoms, with need for Flexeril for back pain (which interacted with many AAD therapies), underwent repeat ablation 5/28/2025.  He had uneventful reisolation of the PVs and isolation of the SVC and LA wall using PFA    Today's Visit:  Rhythm-mcconnell, Max thinks he's done well since ablation. No issues with palpitations, dizziness, lightheadedness.     He thinks he's done well following ablation, without pleuritic CP, fever, chills, or trouble swallowing.  No groin site discomfort.    Denies any issues with exertional CP. No c/o STRATTON/SOB. Continues to \"try all the time\" to use CPAP but is still struggling getting used to it.    He was planning on having R hip surgery towards the end of June and wasn't aware that we typically would not AC following ablation for up to 3 months. He will plan on postponing this, but wondering if it has to be 3 m.    VITALS:  Vitals: /69 (BP Location: Right arm, Patient Position: Sitting)   Pulse 64   Ht 1.797 m (5' 10.75\")   Wt 135.8 kg (299 lb 4.8 oz)   SpO2 94%   BMI 42.04 kg/m      Diagnostic Testing:  EKG today, which I " overread, showed SR 65 with iRBBB.   ms on flecainide 150 mg BID and carvedilol 50 mg BID  ZioPatch 11/2024-SR with 11% AF burden, avg  bpm, lasting up to 1h19m  Echocardiogram 6/2024-LVEF 60 to 65%.  No RWMA.  Normal RV. Normal LA size. No significant valve abnormalities      Component      Latest Ref Rng 5/28/2025  11:53 AM   WBC      4.0 - 11.0 10e3/uL 9.7    RBC Count      4.40 - 5.90 10e6/uL 4.20 (L)    Hemoglobin      13.3 - 17.7 g/dL 12.8 (L)    Hematocrit      40.0 - 53.0 % 38.5 (L)    MCV      78 - 100 fL 92    MCH      26.5 - 33.0 pg 30.5    MCHC      31.5 - 36.5 g/dL 33.2    RDW      10.0 - 15.0 % 13.0    Platelet Count      150 - 450 10e3/uL 233           Plan:  Will reach out to Dr. Mata re: upcoming hip surgery; may be comfortable holding AC after 2 m post ablation  2.    Follow-up with Dr. Mata 9/2025 as planned  3.    Call with recurrent AFib >2 hours    Assessment/Plan:    Paroxysmal AFib   As above, first noted on ILR 2020.  First ablation 11/2023 aborted due to oropharynx injury.  Completed AF ablation 11/2024, requiring repeat ablation with reisolation of PVCs, as well as isolation of SVC/LA wall 5/28/2025.  Remains on carvedilol 50 mg BID and flecainide 150 mg BID    EKG today with SR. iRBBB noted, QRS more narrow than in hospital on flecainide 150 mg BID and carvedilol 50 mg BID  Echo 6/2024 with normal LVEF    On AC with Eliquis 5 mg BID.  Dose appropriate for age/weight/creatinine.  Last hemoglobin low/stable 12.8 g/dL 5/28    PLAN:  Will continue flecainide 150 mg BID for now  Call if AFib >2 hours  Reviewed no hold of AC recommended for up to 3 months following ablation and unfortunately, would likely need to postpone R hip surgery. Apologized this wasn't communicated earlier and offered to reach out to Dr. Mata to see when earliest hold of AC could be advised  See Dr. Mata 9/23/2025 as planned. AAD therapy may be stopped at that time    HTN  Remains on carvedilol 50 mg twice  daily, lisinopril/HCTZ 20/25 1 daily, spironolactone 25 mg daily  BP today tooks great   Blood work 5/2025 with normal electrolytes and abnl renal function. Notes PCP repeated yesterday and Cr has normalized.     PLAN:  Continue current medications    Upcoming hip surgery  Reviewed he was hoping to have this done at end of June, though will postpone as will only be ~1 m from repeat AFib ablation and typically would not want to hold AC during blanking period  Reviewed echo 6/2024 with normal LVEF  Continues to deny exertional CP  EKG today without ischemic changes and SR.     PLAN:  Will reach out to Dr. Mata to determine when earliest hold of Eliquis would be advised given R hip pain  Given echo results and lack of symptoms, would not require additional cardiac testing prior to hip surgery  Would continue flecainide and carvedilol in setting of known AFib to help prevent recurrent arrhythmias in hugo-operative period     Shani Balderrama PA-C, MSPAS      Orders Placed This Encounter   Procedures    EKG 12-lead complete w/read - Clinics (performed today)     No orders of the defined types were placed in this encounter.    There are no discontinued medications.      Encounter Diagnosis   Name Primary?    Paroxysmal atrial fibrillation (H)        CURRENT MEDICATIONS:  Current Outpatient Medications   Medication Sig Dispense Refill    albuterol (PROAIR HFA/PROVENTIL HFA/VENTOLIN HFA) 108 (90 Base) MCG/ACT inhaler Inhale 2 puffs into the lungs as needed      apixaban ANTICOAGULANT (ELIQUIS) 5 MG tablet Take 1 tablet (5 mg) by mouth 2 times daily 180 tablet 3    carvedilol (COREG) 25 MG tablet Take 2 tablets (50 mg) by mouth 2 times daily. 180 tablet 2    Coenzyme Q10 (COQ10 PO) Take 100 mg by mouth daily.      flecainide (TAMBOCOR) 150 MG tablet Take 1 tablet (150 mg) by mouth 2 times daily. 180 tablet 1    levalbuterol (XOPENEX HFA) 45 MCG/ACT inhaler Inhale 1-2 puffs into the lungs every 4 hours as needed for shortness of  "breath or wheezing. 15 g 0    lisinopril-hydrochlorothiazide (ZESTORETIC) 20-25 MG tablet Take 1 tablet by mouth daily. 90 tablet 3    spironolactone (ALDACTONE) 25 MG tablet Take 1 tablet (25 mg) by mouth daily. 90 tablet 3    Multiple Vitamins-Minerals (CENTRUM ADULT PO) Take 1 tablet by mouth daily. (Patient not taking: Reported on 6/4/2025)      Omega-3 Fatty Acids (FISH OIL PO) Take 1 capsule by mouth daily. (Patient not taking: Reported on 6/4/2025)         ALLERGIES   No Known Allergies      Review of Systems:  Skin:  not assessed     Eyes:  not assessed    ENT:  not assessed    Respiratory:  Negative    Cardiovascular:    Positive for, palpitations  Gastroenterology: Positive for reflux  Genitourinary:  not assessed    Musculoskeletal:  not assessed    Neurologic:  Negative    Psychiatric:  not assessed    Heme/Lymph/Imm:  Negative    Endocrine:  Negative      Physical Exam:  Vitals: /69 (BP Location: Right arm, Patient Position: Sitting)   Pulse 64   Ht 1.797 m (5' 10.75\")   Wt 135.8 kg (299 lb 4.8 oz)   SpO2 94%   BMI 42.04 kg/m      Constitutional:  cooperative, alert and oriented, well developed, well nourished, in no acute distress        Skin:  warm and dry to the touch, no apparent skin lesions or masses noted        Head:  normocephalic, no masses or lesions        Eyes:  pupils equal and round, conjunctivae and lids unremarkable, sclera white, no xanthalasma        ENT:  no pallor or cyanosis, dentition good        Neck:  JVP normal        Chest:  normal breath sounds, clear to auscultation, normal A-P diameter, normal symmetry, normal respiratory excursion, no use of accessory muscles        Cardiac: regular rhythm, no murmurs, gallops or rubs detected                  Abdomen:  abdomen soft obese      Vascular: pulses full and equal                               right femoral bruit (-) mild bruising R FV access site 6/2025. no tenderness. Soft    Extremities and Back:  no edema    "     Neurological:  no gross motor deficits            PAST MEDICAL HISTORY:  Past Medical History:   Diagnosis Date    Anxiety     Diabetes mellitus (H)     Difficult intubation     2024 at [facility] [place] with an attempt to do an a.fib ablation    Former smoker     Hypertension 2011    Morbid obesity (H) 2011    Paroxysmal atrial fibrillation (H)     ablations x3    Sleep apnea 2011    Uncomplicated asthma        PAST SURGICAL HISTORY:  Past Surgical History:   Procedure Laterality Date    EP ABLATION FOCAL AFIB   at [facility] [place] w/ difficult intubtion - case aborted    EP ABLATION FOCAL AFIB N/A 2024    Procedure: Ablation Atrial Fibrilation;  Surgeon: Kaushal Mata MD;  Location:  HEART CARDIAC CATH LAB    EP ABLATION FOCAL AFIB N/A 2025    Procedure: Ablation Atrial Fibrilation;  Surgeon: Kaushal Mata MD;  Location:  HEART CARDIAC CATH LAB    EP LOOP RECORDER      ORIF right elbow         FAMILY HISTORY:  Family History   Problem Relation Age of Onset    Cerebrovascular Disease Maternal Grandfather     Hypertension Maternal Grandfather     Respiratory Mother         COPD       SOCIAL HISTORY:  Social History     Socioeconomic History    Marital status: Single     Spouse name: None    Number of children: None    Years of education: None    Highest education level: None   Tobacco Use    Smoking status: Former     Current packs/day: 0.00     Average packs/day: 2.0 packs/day for 18.0 years (36.0 ttl pk-yrs)     Types: Cigarettes     Start date: 1993     Quit date: 2011     Years since quittin.7    Smokeless tobacco: Current    Tobacco comments:     Uses vape   Substance and Sexual Activity    Alcohol use: Not Currently     Alcohol/week: 1.7 standard drinks of alcohol     Types: 2 drink(s) per week    Drug use: Yes     Types: Marijuana     Comment: pt did this yesterday 24    Sexual activity: Yes     Partners: Female   Other Topics Concern    Exercise Yes     Comment:  treadmill   Social History Narrative    ** Merged History Encounter **          Social Drivers of Health     Interpersonal Safety: Low Risk  (5/28/2025)    Interpersonal Safety     Do you feel physically and emotionally safe where you currently live?: Yes     Within the past 12 months, have you been hit, slapped, kicked or otherwise physically hurt by someone?: No     Within the past 12 months, have you been humiliated or emotionally abused in other ways by your partner or ex-partner?: No

## 2025-06-04 ENCOUNTER — OFFICE VISIT (OUTPATIENT)
Dept: CARDIOLOGY | Facility: CLINIC | Age: 45
End: 2025-06-04
Payer: COMMERCIAL

## 2025-06-04 VITALS
SYSTOLIC BLOOD PRESSURE: 110 MMHG | OXYGEN SATURATION: 94 % | HEART RATE: 64 BPM | HEIGHT: 71 IN | BODY MASS INDEX: 41.9 KG/M2 | DIASTOLIC BLOOD PRESSURE: 69 MMHG | WEIGHT: 299.3 LBS

## 2025-06-04 DIAGNOSIS — I48.0 PAROXYSMAL ATRIAL FIBRILLATION (H): ICD-10-CM

## 2025-06-04 NOTE — PATIENT INSTRUCTIONS
Max - it was nice to see you today!    Today we reviewed:    EKG today looked good!  No recurrent AFib that you're aware of    MEDICATION CHANGES:    Do not stop Eliquis after ablation - I'll check with Dr. Mata about stopping it @ ~2 months given you are looking for hip surgery    RECOMMENDATIONS:    Use CPAP as able  Call if AFib >2 hours      FOLLOW UP:    Dr. Mata 9/2025 as planned    IMPORTANT PHONE NUMBERS FOR  [facility] ([place]):  Arrhythmia nurses Sylvia Graham, & Rosa: [phone]

## 2025-06-04 NOTE — LETTER
"6/4/2025    [facility] [place]  [address]  [facility] [address]    RE: Max Patiño       Dear Colleague,     I had the pleasure of seeing Max Patñio in the [facility].  [facility]    I had the pleasure of seeing Max when he came for follow up of AF.  This 45 year old sees Dr. Mata for his history of:    Symptomatic paroxysmal AFib - noted on ILR placed for palpitations (2020).  Attempted ablation 11/2023 at [facility] [place] aborted due to oropharynx injury.  S/p EPS/AFib ablation (PVI, empiric CTI 7/8/2024).  Updated ZioPatch 11/2024 showed 11% AF burden.  Started on flecainide.  Repeat ablation (PVI, SVC, LA wall with PFA) 5/28/2025  HTN  SALVADOR -  Working on CPAP   Prior heavy EtOH use - quit ~2021      Last Visit & Interval History:  Dr. Mata saw him 10/2024 at which time he was noting increasing palpitations.  Updated ZioPatch showed 11% AF burden of AFib .  Started on flecainide 11/2024 but contacted us again 2/2025 due to continued episodes of AF despite flecainide.  Dose was increased but given continued symptoms, with need for Flexeril for back pain (which interacted with many AAD therapies), underwent repeat ablation 5/28/2025.  He had uneventful reisolation of the PVs and isolation of the SVC and LA wall using PFA    Today's Visit:  Rhythm-mcconnell, Max thinks he's done well since ablation. No issues with palpitations, dizziness, lightheadedness.     He thinks he's done well following ablation, without pleuritic CP, fever, chills, or trouble swallowing.  No groin site discomfort.    Denies any issues with exertional CP. No c/o STRATTON/SOB. Continues to \"try all the time\" to use CPAP but is still struggling getting used to it.    He was planning on having R hip surgery towards the end of June and wasn't aware that we typically would not AC following ablation for up to 3 months. He will plan on postponing this, but wondering if it has to be 3 " "m.    VITALS:  Vitals: /69 (BP Location: Right arm, Patient Position: Sitting)   Pulse 64   Ht 1.797 m (5' 10.75\")   Wt 135.8 kg (299 lb 4.8 oz)   SpO2 94%   BMI 42.04 kg/m      Diagnostic Testing:  EKG today, which I overread, showed SR 65 with iRBBB.   ms on flecainide 150 mg BID and carvedilol 50 mg BID  ZioPatch 11/2024-SR with 11% AF burden, avg  bpm, lasting up to 1h19m  Echocardiogram 6/2024-LVEF 60 to 65%.  No RWMA.  Normal RV. Normal LA size. No significant valve abnormalities      Component      Latest Ref Rng 5/28/2025  11:53 AM   WBC      4.0 - 11.0 10e3/uL 9.7    RBC Count      4.40 - 5.90 10e6/uL 4.20 (L)    Hemoglobin      13.3 - 17.7 g/dL 12.8 (L)    Hematocrit      40.0 - 53.0 % 38.5 (L)    MCV      78 - 100 fL 92    MCH      26.5 - 33.0 pg 30.5    MCHC      31.5 - 36.5 g/dL 33.2    RDW      10.0 - 15.0 % 13.0    Platelet Count      150 - 450 10e3/uL 233           Plan:  Will reach out to Dr. Mata re: upcoming hip surgery; may be comfortable holding AC after 2 m post ablation  2.    Follow-up with Dr. Mata 9/2025 as planned  3.    Call with recurrent AFib >2 hours    Assessment/Plan:    Paroxysmal AFib   As above, first noted on ILR 2020.  First ablation 11/2023 aborted due to oropharynx injury.  Completed AF ablation 11/2024, requiring repeat ablation with reisolation of PVCs, as well as isolation of SVC/LA wall 5/28/2025.  Remains on carvedilol 50 mg BID and flecainide 150 mg BID    EKG today with SR. iRBBB noted, QRS more narrow than in hospital on flecainide 150 mg BID and carvedilol 50 mg BID  Echo 6/2024 with normal LVEF    On AC with Eliquis 5 mg BID.  Dose appropriate for age/weight/creatinine.  Last hemoglobin low/stable 12.8 g/dL 5/28    PLAN:  Will continue flecainide 150 mg BID for now  Call if AFib >2 hours  Reviewed no hold of AC recommended for up to 3 months following ablation and unfortunately, would likely need to postpone R hip surgery. Apologized this " wasn't communicated earlier and offered to reach out to Dr. Mata to see when earliest hold of AC could be advised  See Dr. Mata 9/23/2025 as planned. AAD therapy may be stopped at that time    HTN  Remains on carvedilol 50 mg twice daily, lisinopril/HCTZ 20/25 1 daily, spironolactone 25 mg daily  BP today tooks great   Blood work 5/2025 with normal electrolytes and abnl renal function. Notes PCP repeated yesterday and Cr has normalized.     PLAN:  Continue current medications    Upcoming hip surgery  Reviewed he was hoping to have this done at end of June, though will postpone as will only be ~1 m from repeat AFib ablation and typically would not want to hold AC during blanking period  Reviewed echo 6/2024 with normal LVEF  Continues to deny exertional CP  EKG today without ischemic changes and SR.     PLAN:  Will reach out to Dr. Mata to determine when earliest hold of Eliquis would be advised given R hip pain  Given echo results and lack of symptoms, would not require additional cardiac testing prior to hip surgery  Would continue flecainide and carvedilol in setting of known AFib to help prevent recurrent arrhythmias in hugo-operative period     Shani Balderrama PA-C, MSPAS      Orders Placed This Encounter   Procedures     EKG 12-lead complete w/read - Clinics (performed today)     No orders of the defined types were placed in this encounter.    There are no discontinued medications.      Encounter Diagnosis   Name Primary?     Paroxysmal atrial fibrillation (H)        CURRENT MEDICATIONS:  Current Outpatient Medications   Medication Sig Dispense Refill     albuterol (PROAIR HFA/PROVENTIL HFA/VENTOLIN HFA) 108 (90 Base) MCG/ACT inhaler Inhale 2 puffs into the lungs as needed       apixaban ANTICOAGULANT (ELIQUIS) 5 MG tablet Take 1 tablet (5 mg) by mouth 2 times daily 180 tablet 3     carvedilol (COREG) 25 MG tablet Take 2 tablets (50 mg) by mouth 2 times daily. 180 tablet 2     Coenzyme Q10 (COQ10 PO) Take 100 mg by  "mouth daily.       flecainide (TAMBOCOR) 150 MG tablet Take 1 tablet (150 mg) by mouth 2 times daily. 180 tablet 1     levalbuterol (XOPENEX HFA) 45 MCG/ACT inhaler Inhale 1-2 puffs into the lungs every 4 hours as needed for shortness of breath or wheezing. 15 g 0     lisinopril-hydrochlorothiazide (ZESTORETIC) 20-25 MG tablet Take 1 tablet by mouth daily. 90 tablet 3     spironolactone (ALDACTONE) 25 MG tablet Take 1 tablet (25 mg) by mouth daily. 90 tablet 3     Multiple Vitamins-Minerals (CENTRUM ADULT PO) Take 1 tablet by mouth daily. (Patient not taking: Reported on 6/4/2025)       Omega-3 Fatty Acids (FISH OIL PO) Take 1 capsule by mouth daily. (Patient not taking: Reported on 6/4/2025)         ALLERGIES   No Known Allergies      Review of Systems:  Skin:  not assessed     Eyes:  not assessed    ENT:  not assessed    Respiratory:  Negative    Cardiovascular:    Positive for, palpitations  Gastroenterology: Positive for reflux  Genitourinary:  not assessed    Musculoskeletal:  not assessed    Neurologic:  Negative    Psychiatric:  not assessed    Heme/Lymph/Imm:  Negative    Endocrine:  Negative      Physical Exam:  Vitals: /69 (BP Location: Right arm, Patient Position: Sitting)   Pulse 64   Ht 1.797 m (5' 10.75\")   Wt 135.8 kg (299 lb 4.8 oz)   SpO2 94%   BMI 42.04 kg/m      Constitutional:  cooperative, alert and oriented, well developed, well nourished, in no acute distress        Skin:  warm and dry to the touch, no apparent skin lesions or masses noted        Head:  normocephalic, no masses or lesions        Eyes:  pupils equal and round, conjunctivae and lids unremarkable, sclera white, no xanthalasma        ENT:  no pallor or cyanosis, dentition good        Neck:  JVP normal        Chest:  normal breath sounds, clear to auscultation, normal A-P diameter, normal symmetry, normal respiratory excursion, no use of accessory muscles        Cardiac: regular rhythm, no murmurs, gallops or rubs " detected                  Abdomen:  abdomen soft obese      Vascular: pulses full and equal                               right femoral bruit (-) mild bruising R FV access site 2025. no tenderness. Soft    Extremities and Back:  no edema        Neurological:  no gross motor deficits            PAST MEDICAL HISTORY:  Past Medical History:   Diagnosis Date     Anxiety      Diabetes mellitus (H)      Difficult intubation     2024 at [facility] [place] with an attempt to do an a.fib ablation     Former smoker      Hypertension 2011     Morbid obesity (H) 2011     Paroxysmal atrial fibrillation (H)     ablations x3     Sleep apnea 2011     Uncomplicated asthma        PAST SURGICAL HISTORY:  Past Surgical History:   Procedure Laterality Date     EP ABLATION FOCAL AFIB   at [facility] [place] w/ difficult intubtion - case aborted     EP ABLATION FOCAL AFIB N/A 2024    Procedure: Ablation Atrial Fibrilation;  Surgeon: Kaushal Mata MD;  Location:  HEART CARDIAC CATH LAB     EP ABLATION FOCAL AFIB N/A 2025    Procedure: Ablation Atrial Fibrilation;  Surgeon: Kaushal Mata MD;  Location:  HEART CARDIAC CATH LAB     EP LOOP RECORDER       ORIF right elbow         FAMILY HISTORY:  Family History   Problem Relation Age of Onset     Cerebrovascular Disease Maternal Grandfather      Hypertension Maternal Grandfather      Respiratory Mother         COPD       SOCIAL HISTORY:  Social History     Socioeconomic History     Marital status: Single     Spouse name: None     Number of children: None     Years of education: None     Highest education level: None   Tobacco Use     Smoking status: Former     Current packs/day: 0.00     Average packs/day: 2.0 packs/day for 18.0 years (36.0 ttl pk-yrs)     Types: Cigarettes     Start date: 1993     Quit date: 2011     Years since quittin.7     Smokeless tobacco: Current     Tobacco comments:     Uses vape   Substance and Sexual Activity     Alcohol use:  Not Currently     Alcohol/week: 1.7 standard drinks of alcohol     Types: 2 drink(s) per week     Drug use: Yes     Types: Marijuana     Comment: pt did this yesterday 7/7/24     Sexual activity: Yes     Partners: Female   Other Topics Concern     Exercise Yes     Comment: treadmill   Social History Narrative    ** Merged History Encounter **          Social Drivers of Health     Interpersonal Safety: Low Risk  (5/28/2025)    Interpersonal Safety      Do you feel physically and emotionally safe where you currently live?: Yes      Within the past 12 months, have you been hit, slapped, kicked or otherwise physically hurt by someone?: No      Within the past 12 months, have you been humiliated or emotionally abused in other ways by your partner or ex-partner?: No               Thank you for allowing me to participate in the care of your patient.      Sincerely,     Dulce Maria Balderrama PA-C     [facility]  cc:   Kaushal Roldan MD  [address]  [place] [address]

## 2025-06-05 ENCOUNTER — RESULTS FOLLOW-UP (OUTPATIENT)
Dept: CARDIOLOGY | Facility: CLINIC | Age: 45
End: 2025-06-05

## 2025-07-24 ENCOUNTER — TELEPHONE (OUTPATIENT)
Dept: CARDIOLOGY | Facility: CLINIC | Age: 45
End: 2025-07-24
Payer: COMMERCIAL

## 2025-07-24 DIAGNOSIS — I10 PRIMARY HYPERTENSION: ICD-10-CM

## 2025-07-24 NOTE — TELEPHONE ENCOUNTER
Pt left message that he was out of his Carvedilol. LM for pt that he should have refills available at his pharmacy. Ebenezer

## 2025-07-29 RX ORDER — CARVEDILOL 25 MG/1
50 TABLET ORAL 2 TIMES DAILY
Qty: 120 TABLET | Refills: 11 | Status: SHIPPED | OUTPATIENT
Start: 2025-07-29

## 2025-07-29 NOTE — TELEPHONE ENCOUNTER
7/29/25 Pt called back and LVM that the rx for Carvedilol needs to go to [facility] on [address]   [facility] 1135 am

## (undated) DEVICE — CATH EP CATH EP REPRO DAIG RSPN FX CRV DX EP C

## (undated) DEVICE — TUBE SET SMARKABLATE IRRIGATION

## (undated) DEVICE — INTRODUCER SHEATH VASC CATH 8.5FR CARTO GIDE STH MED D138502

## (undated) DEVICE — CATH ABLATION FARAWAVE 115X180CM OD31 MM OTW 20 M004PF41M401

## (undated) DEVICE — CATH SOUNDSTAR 8FRX90CM 10439011

## (undated) DEVICE — NEEDLE TRANSSEPTAL 18GA X 98CM 86 DEG

## (undated) DEVICE — CATH RF CARD ABL BID JJ THERMO

## (undated) DEVICE — PATCH CARTO 3 EXTERNAL REFERENCE 3D MAPPING CREFP6

## (undated) DEVICE — Device

## (undated) DEVICE — SHEATH INTRO FARADRIVE L74 CM ID13 FR STEERABLE M004PF21M402

## (undated) DEVICE — DEFIB PRO-PADZ LVP LQD GEL ADULT 8900-2105-01

## (undated) DEVICE — CLOSURE DEVICE 6FR VASC PROGLIDE MEDICATED SUTURE 12673-03

## (undated) DEVICE — CABLE TEMP PACING 2MM PA-801

## (undated) DEVICE — CABLE CATH FARASTAR CONNECTION STERILE LF DISP M004PF41M434

## (undated) DEVICE — PACK EP SRG PROC LF DISP SAN32EPFSR

## (undated) DEVICE — INTRODUCER SHEATH FAST-CATH 9FRX12CM 406116

## (undated) DEVICE — CATH NAV PENTARAY F CURVE

## (undated) DEVICE — CATH EP 7FR X 115CM DECANAV CA

## (undated) DEVICE — INTRODUCER SHEATH GREEN 6.5FRX11CM .038IN PSI-6F-11-038ACT

## (undated) DEVICE — DEVICE CLOSURE VASCADE PERCUTANEOUS 800-612C-10U

## (undated) DEVICE — GUIDE WIRE SHEATH VERSACROSS D1 CURVE 93CM L180 VXAK0041

## (undated) RX ORDER — OXYCODONE AND ACETAMINOPHEN 5; 325 MG/1; MG/1
TABLET ORAL
Status: DISPENSED
Start: 2025-05-28

## (undated) RX ORDER — FENTANYL CITRATE 50 UG/ML
INJECTION, SOLUTION INTRAMUSCULAR; INTRAVENOUS
Status: DISPENSED
Start: 2024-07-08

## (undated) RX ORDER — FENTANYL CITRATE 0.05 MG/ML
INJECTION, SOLUTION INTRAMUSCULAR; INTRAVENOUS
Status: DISPENSED
Start: 2024-07-08

## (undated) RX ORDER — LIDOCAINE HYDROCHLORIDE 10 MG/ML
INJECTION, SOLUTION EPIDURAL; INFILTRATION; INTRACAUDAL; PERINEURAL
Status: DISPENSED
Start: 2024-07-08

## (undated) RX ORDER — HEPARIN SODIUM 1000 [USP'U]/ML
INJECTION, SOLUTION INTRAVENOUS; SUBCUTANEOUS
Status: DISPENSED
Start: 2024-07-08

## (undated) RX ORDER — HEPARIN SODIUM 1000 [USP'U]/ML
INJECTION, SOLUTION INTRAVENOUS; SUBCUTANEOUS
Status: DISPENSED
Start: 2025-05-28

## (undated) RX ORDER — HEPARIN SODIUM 10000 [USP'U]/100ML
INJECTION, SOLUTION INTRAVENOUS
Status: DISPENSED
Start: 2024-07-08

## (undated) RX ORDER — PROTAMINE SULFATE 10 MG/ML
INJECTION, SOLUTION INTRAVENOUS
Status: DISPENSED
Start: 2025-05-28

## (undated) RX ORDER — LIDOCAINE HYDROCHLORIDE 10 MG/ML
INJECTION, SOLUTION EPIDURAL; INFILTRATION; INTRACAUDAL; PERINEURAL
Status: DISPENSED
Start: 2025-05-28

## (undated) RX ORDER — HEPARIN SODIUM 200 [USP'U]/100ML
INJECTION, SOLUTION INTRAVENOUS
Status: DISPENSED
Start: 2025-05-28

## (undated) RX ORDER — FENTANYL CITRATE 50 UG/ML
INJECTION, SOLUTION INTRAMUSCULAR; INTRAVENOUS
Status: DISPENSED
Start: 2025-05-28

## (undated) RX ORDER — PROTAMINE SULFATE 10 MG/ML
INJECTION, SOLUTION INTRAVENOUS
Status: DISPENSED
Start: 2024-07-08

## (undated) RX ORDER — KETOROLAC TROMETHAMINE 30 MG/ML
INJECTION, SOLUTION INTRAMUSCULAR; INTRAVENOUS
Status: DISPENSED
Start: 2024-07-08